# Patient Record
Sex: MALE | Race: WHITE | NOT HISPANIC OR LATINO | Employment: FULL TIME | URBAN - METROPOLITAN AREA
[De-identification: names, ages, dates, MRNs, and addresses within clinical notes are randomized per-mention and may not be internally consistent; named-entity substitution may affect disease eponyms.]

---

## 2018-11-29 ENCOUNTER — OFFICE VISIT CONVERTED (OUTPATIENT)
Dept: FAMILY MEDICINE CLINIC | Facility: CLINIC | Age: 48
End: 2018-11-29
Attending: NURSE PRACTITIONER

## 2019-04-11 ENCOUNTER — HOSPITAL ENCOUNTER (OUTPATIENT)
Dept: URGENT CARE | Facility: CLINIC | Age: 49
Discharge: HOME | End: 2019-04-11

## 2019-12-17 ENCOUNTER — HOSPITAL ENCOUNTER (OUTPATIENT)
Dept: FAMILY MEDICINE CLINIC | Facility: CLINIC | Age: 49
Discharge: HOME | End: 2019-12-17
Attending: NURSE PRACTITIONER

## 2019-12-17 ENCOUNTER — OFFICE VISIT CONVERTED (OUTPATIENT)
Dept: FAMILY MEDICINE CLINIC | Facility: CLINIC | Age: 49
End: 2019-12-17
Attending: NURSE PRACTITIONER

## 2019-12-17 LAB — PSA SERPL-MCNC: 2.64 NG/ML (ref 0–4)

## 2020-12-28 ENCOUNTER — TELEMEDICINE CONVERTED (OUTPATIENT)
Dept: FAMILY MEDICINE CLINIC | Facility: CLINIC | Age: 50
End: 2020-12-28
Attending: NURSE PRACTITIONER

## 2021-05-14 NOTE — PROGRESS NOTES
Progress Note      Patient Name: Marcelino Ogden   Patient ID: [id number]   Sex: Male   YOB: 1970    Primary Care Provider: Libra PATE    Visit Date: December 28, 2020    Provider: RIO Dent   Location: [facility]   Location Address: [address]  [place], [place]  [id number]   Location Phone: [phone]          Chief Complaint     Follow up       History Of Present Illness  Marcelino Ogden is a 50 year old /White male who presents for evaluation and treatment of:   Video Conferencing Visit  Marcelino Ogden is a 50 year old /White male who is presenting for evaluation via video conferencing via [employer]. Verbal consent obtained before beginning visit.   The following staff were present during this visit: Coco coleman, pt, lucitaw      He is a  and he is working from home most days.    He needs a refill the singular.  He had an appt with [facility] for labs but he cancelled  He started chewing at 6 years old.  GERD:  He is doing well with current med. If he eats at night he will take a tagament with the [employer].  His dentist checked 130's/80's at the dentist last week.       Past Medical History  Disease Name Date Onset Notes   Allergies --  --    Reflux Disease --  --          Past Surgical History  Procedure Name Date Notes   Colon Surgery 1970 Infant   Kidney Stone Surgery, Unspecified 1993 --          Medication List  Name Date Started Instructions   multivitamin oral tablet  take 1 tablet by oral route daily   omeprazole 40 mg oral capsule,delayed release(DR/EC)  take 1 capsule (40 mg) by oral route once daily before a meal   Singulair 10 mg oral tablet 12/28/2020 take 1 tablet (10 mg) by oral route once daily in the evening for 30 days   Zyrtec 10 mg oral tablet  take 1 tablet (10 mg) by oral route once daily         Allergy List  Allergen Name Date Reaction Notes   NO KNOWN DRUG ALLERGIES --  --  --        Allergies  Reconciled  Family Medical History  Disease Name Relative/Age Notes   Transient Ischemic Attack (TIA) Father/   --    Heart Disease Father/   bypass suregery   Breast Neoplasm Mother/   --    Colon Neoplasm Mother/   --    Lung cancer Mother/   --          Social History  Finding Status Start/Stop Quantity Notes   Alcohol Never --/-- --  --     --  --/-- --  --    Professional --  --/-- --  Laboatory supervisor   Smokeless tobacco Current every day 5/-- 1 can 12/28/2020 - 11/29/2018 -    Tobacco Current some day --/-- --  dip         Immunizations  NameDate Admin Mfg Trade Name Lot Number Route Inj VIS Given VIS Publication   Hepatitis A12/17/2019 NE Not Entered  NE NE 12/17/2019    Comments: Second Dose [facility]   Qpwxhsilb44/26/2020 NE Not Entered  NE NE 10/26/2020    Comments:    Tdap10/01/2019 [employer]RI[employer]  NE NE 12/17/2019    Comments:          Review of Systems  · Constitutional  o Denies  o : fever, fatigue, weight gain  · HENT  o Denies  o : nasal congestion, postnasal drip, sore throat, ear pain  · Cardiovascular  o Denies  o : lower extremity edema, claudication, chest pressure, palpitations  · Respiratory  o Denies  o : shortness of breath, wheezing, cough, hemoptysis, dyspnea on exertion  · Gastrointestinal  o Denies  o : nausea, vomiting, diarrhea, constipation, abdominal pain  · Psychiatric  o Denies  o : suicidal ideation, homicidal ideation      Physical Examination  · Constitutional  o Appearance  o : well-nourished, well developed, alert, in no acute distress  · Respiratory  o Respiratory Effort  o : breathing unlabored  o Auscultation of Lungs  o : normal breath sounds throughout  · Cardiovascular  o Heart  o :   § Auscultation of Heart  § : regular rate and rhythm, no murmurs, gallops or rubs  § Palpation of Heart  § : normal apical impulse, no cardiac thrill present  o Peripheral Vascular System  o :   § Carotid Arteries  § : normal pulses bilaterally, no bruits  present  § Pedal Pulses  § : pulses 2 bilaterally  § Extremities  § : no cyanosis, clubbing or edema; less than 2 second refill noted  · Neurologic  o Mental Status Examination  o :   § Orientation  § : grossly oriented to person, place and time  o Cranial Nerves  o : cranial nerves intact and symmetric throughout  · Psychiatric  o Mood and Affect  o : mood normal, affect appropriate, denies any SI/HI          Assessment  · Allergic rhinitis due to allergen     477.9/J30.9  · Gastroesophageal reflux disease without esophagitis     530.81/K21.9  · Nicotine dependence     305.1/F17.200  · Screening for depression     V79.0/Z13.89  · Screening for prostate cancer     V76.44/Z12.5  · Medication monitoring encounter     V58.83/Z51.81      Plan  · Orders  o ACO-18: Negative screen for clinical depression using a standardized tool () - V79.0/Z13.89 - 12/28/2020  o ACO-17: Screened for tobacco use AND received tobacco cessation intervention (4004F) - - 12/28/2020  o ACO-39: Current medications updated and reviewed () - - 12/28/2020  o ACO-14: Influenza immunization administered or previously received () - - 12/28/2020  o ACO-13: Fall Risk Screening with no falls in past year or only one fall without injury in the past year (1101F) - - 12/28/2020  · Medications  o Singulair 10 mg oral tablet   SIG: take 1 tablet (10 mg) by oral route once daily in the evening for 30 days   DISP: (30) Tablet with 11 refills  Refilled on 12/28/2020     · Instructions  o *Form of nicotine being used: dip  o Patient was strongly encouraged to discontinue use of any nicotine containing product or minimize the use of the product.  o Depression Screen completed and scanned into the EMR under the designated folder within the patient's documents.  o PHQ-9 result is 0   o Take all medications as prescribed/directed.  o Patient was educated/instructed on their diagnosis, treatment and medications prior to discharge from the clinic  today.  o Patient instructed to seek medical attention urgently for new or worsening symptoms.  o Call the office with any concerns or questions.  o Call with any concerns or questions. He is doing the singular. He does not want to stop using dip.   o He will call when he is ready for labs. He has cancelled 3 times with [facility]. He is trying to stay out of facility. He will let me know.   · Disposition  o Call or Return if symptoms worsen or persist.            Electronically Signed by: RIO Dent -Author on December 28, 2020 02:03:20 PM

## 2021-05-15 VITALS
SYSTOLIC BLOOD PRESSURE: 128 MMHG | WEIGHT: 189.25 LBS | RESPIRATION RATE: 12 BRPM | OXYGEN SATURATION: 96 % | TEMPERATURE: 99.1 F | HEART RATE: 99 BPM | DIASTOLIC BLOOD PRESSURE: 90 MMHG | BODY MASS INDEX: 28.03 KG/M2 | HEIGHT: 69 IN

## 2021-05-16 VITALS
TEMPERATURE: 99.2 F | RESPIRATION RATE: 12 BRPM | HEIGHT: 69 IN | OXYGEN SATURATION: 98 % | SYSTOLIC BLOOD PRESSURE: 153 MMHG | DIASTOLIC BLOOD PRESSURE: 95 MMHG | HEART RATE: 107 BPM | WEIGHT: 179.44 LBS | BODY MASS INDEX: 26.58 KG/M2

## 2022-01-03 RX ORDER — MONTELUKAST SODIUM 10 MG/1
TABLET ORAL
Qty: 30 TABLET | Refills: 0 | Status: SHIPPED | OUTPATIENT
Start: 2022-01-03 | End: 2022-02-28 | Stop reason: SDUPTHER

## 2022-02-01 RX ORDER — MONTELUKAST SODIUM 10 MG/1
TABLET ORAL
Qty: 30 TABLET | Refills: 0 | OUTPATIENT
Start: 2022-02-01

## 2022-02-28 ENCOUNTER — OFFICE VISIT (OUTPATIENT)
Dept: FAMILY MEDICINE CLINIC | Facility: CLINIC | Age: 52
End: 2022-02-28

## 2022-02-28 VITALS
WEIGHT: 190 LBS | RESPIRATION RATE: 12 BRPM | SYSTOLIC BLOOD PRESSURE: 144 MMHG | HEART RATE: 90 BPM | HEIGHT: 69 IN | BODY MASS INDEX: 28.14 KG/M2 | TEMPERATURE: 98.3 F | DIASTOLIC BLOOD PRESSURE: 94 MMHG | OXYGEN SATURATION: 97 %

## 2022-02-28 DIAGNOSIS — J30.89 SEASONAL ALLERGIC RHINITIS DUE TO OTHER ALLERGIC TRIGGER: ICD-10-CM

## 2022-02-28 DIAGNOSIS — R12 HEARTBURN: ICD-10-CM

## 2022-02-28 DIAGNOSIS — R93.89 ABNORMAL CXR: ICD-10-CM

## 2022-02-28 DIAGNOSIS — I10 PRIMARY HYPERTENSION: Primary | ICD-10-CM

## 2022-02-28 DIAGNOSIS — Z12.11 SCREEN FOR COLON CANCER: ICD-10-CM

## 2022-02-28 PROBLEM — J30.2 SEASONAL ALLERGIC RHINITIS: Chronic | Status: ACTIVE | Noted: 2022-02-28

## 2022-02-28 PROCEDURE — 99214 OFFICE O/P EST MOD 30 MIN: CPT | Performed by: NURSE PRACTITIONER

## 2022-02-28 RX ORDER — LISINOPRIL 10 MG/1
10 TABLET ORAL DAILY
Qty: 90 TABLET | Refills: 0 | Status: SHIPPED | OUTPATIENT
Start: 2022-02-28 | End: 2022-05-23 | Stop reason: DRUGHIGH

## 2022-02-28 RX ORDER — MONTELUKAST SODIUM 10 MG/1
10 TABLET ORAL EVERY EVENING
Qty: 90 TABLET | Refills: 3 | Status: SHIPPED | OUTPATIENT
Start: 2022-02-28 | End: 2022-05-23 | Stop reason: SDUPTHER

## 2022-03-07 ENCOUNTER — TELEPHONE (OUTPATIENT)
Dept: FAMILY MEDICINE CLINIC | Facility: CLINIC | Age: 52
End: 2022-03-07

## 2022-03-07 DIAGNOSIS — R93.89 ABNORMAL CXR: Primary | ICD-10-CM

## 2022-03-07 NOTE — TELEPHONE ENCOUNTER
3/3/22 TAVR team confirm does not needs CT ANGIO TAVR CHEST ABDOMEN. Please send new order for CTA CHEST for testing scheduling. Thanks!  (Message from hub regarding the CT order).

## 2022-04-01 ENCOUNTER — HOSPITAL ENCOUNTER (OUTPATIENT)
Dept: CT IMAGING | Facility: HOSPITAL | Age: 52
Discharge: HOME | End: 2022-04-01
Admitting: NURSE PRACTITIONER

## 2022-04-01 DIAGNOSIS — R93.89 ABNORMAL CXR: ICD-10-CM

## 2022-04-01 LAB
CREAT BLDA-MCNC: 1 MG/DL
EGFRCR SERPLBLD CKD-EPI 2021: 91.7 ML/MIN/1.73

## 2022-04-01 PROCEDURE — 71275 CT ANGIOGRAPHY CHEST: CPT

## 2022-04-01 PROCEDURE — 0 IOPAMIDOL PER 1 ML: Performed by: NURSE PRACTITIONER

## 2022-04-01 PROCEDURE — 82565 ASSAY OF CREATININE: CPT

## 2022-04-01 RX ADMIN — IOPAMIDOL 100 ML: 755 INJECTION, SOLUTION INTRAVENOUS at 16:59

## 2022-04-04 ENCOUNTER — TELEPHONE (OUTPATIENT)
Dept: FAMILY MEDICINE CLINIC | Facility: CLINIC | Age: 52
End: 2022-04-04

## 2022-04-04 RX ORDER — LISINOPRIL 5 MG/1
5 TABLET ORAL DAILY
COMMUNITY
End: 2022-05-23 | Stop reason: SDUPTHER

## 2022-04-04 NOTE — TELEPHONE ENCOUNTER
Patient states he experienced nose bleed and lower Bood pressure, therefore he decreased his Lisinopril to 5 mg and it is running better. 120/70 P-88.

## 2022-04-11 ENCOUNTER — PREP FOR SURGERY (OUTPATIENT)
Dept: OTHER | Facility: HOSPITAL | Age: 52
End: 2022-04-11

## 2022-04-11 ENCOUNTER — OFFICE VISIT (OUTPATIENT)
Dept: SURGERY | Facility: CLINIC | Age: 52
End: 2022-04-11

## 2022-04-11 VITALS — HEART RATE: 88 BPM | HEIGHT: 69 IN | RESPIRATION RATE: 18 BRPM | WEIGHT: 193 LBS | BODY MASS INDEX: 28.58 KG/M2

## 2022-04-11 DIAGNOSIS — Z12.11 SCREENING FOR MALIGNANT NEOPLASM OF COLON: Primary | ICD-10-CM

## 2022-04-11 DIAGNOSIS — Z80.0 FAMILY HISTORY OF COLON CANCER: ICD-10-CM

## 2022-04-11 PROCEDURE — S0285 CNSLT BEFORE SCREEN COLONOSC: HCPCS | Performed by: NURSE PRACTITIONER

## 2022-04-11 RX ORDER — CETIRIZINE HYDROCHLORIDE 10 MG/1
TABLET ORAL
COMMUNITY

## 2022-04-11 RX ORDER — SODIUM CHLORIDE 0.9 % (FLUSH) 0.9 %
10 SYRINGE (ML) INJECTION AS NEEDED
Status: CANCELLED | OUTPATIENT
Start: 2022-04-11

## 2022-04-11 RX ORDER — POLYETHYLENE GLYCOL 3350 17 G/17G
POWDER, FOR SOLUTION ORAL
Qty: 238 PACKET | Refills: 0 | Status: ON HOLD | OUTPATIENT
Start: 2022-04-11 | End: 2022-07-11

## 2022-04-11 RX ORDER — DIPHENOXYLATE HYDROCHLORIDE AND ATROPINE SULFATE 2.5; .025 MG/1; MG/1
TABLET ORAL DAILY
COMMUNITY

## 2022-04-11 RX ORDER — SODIUM CHLORIDE 0.9 % (FLUSH) 0.9 %
3 SYRINGE (ML) INJECTION EVERY 12 HOURS SCHEDULED
Status: CANCELLED | OUTPATIENT
Start: 2022-04-11

## 2022-04-11 RX ORDER — OMEPRAZOLE 40 MG/1
20 CAPSULE, DELAYED RELEASE ORAL DAILY
COMMUNITY

## 2022-04-11 NOTE — PROGRESS NOTES
Chief Complaint: Colonoscopy (consult)    Subjective      Colonoscopy consultation       History of Present Illness  Marcelino Ogden is a 51 y.o. male presents to [facility] for colonoscopy consultation.    Patient presents today on referral from Leighann Kahn for colonoscopy consultation.    Patient denies any abdominal pain, change in bowel habit, rectal bleeding.    Admits to family history of colon cancer with his mother.    No previous colonoscopy.    Objective     Past Medical History:   Diagnosis Date   • Acid reflux    • Hiatal hernia        Past Surgical History:   Procedure Laterality Date   • COLON SURGERY         Outpatient Medications Marked as Taking for the 4/11/22 encounter (Office Visit) with Jed April, APRN   Medication Sig Dispense Refill   • cetirizine (zyrTEC) 10 MG tablet Zyrtec 10 mg oral tablet take 1 tablet (10 mg) by oral route once daily   Active     • lisinopril (PRINIVIL,ZESTRIL) 5 MG tablet Take 5 mg by mouth Daily.     • montelukast (SINGULAIR) 10 MG tablet Take 1 tablet by mouth Every Evening. 90 tablet 3   • multivitamin (MULTI-VITAMIN DAILY PO) Take  by mouth Daily.     • omeprazole (priLOSEC) 40 MG capsule omeprazole 40 mg oral capsule,delayed release(DR/EC) take 1 capsule (40 mg) by oral route once daily before a meal   Active         Allergies   Allergen Reactions   • Silicone Swelling        Family History   Problem Relation Age of Onset   • Other Mother         breast cancer then 7 years later appendix,lung and colon   • Stroke Father    • Other Paternal Grandmother         chf       Social History     Socioeconomic History   • Marital status:    Tobacco Use   • Smoking status: Never Smoker   • Smokeless tobacco: Current User     Types: Chew   Vaping Use   • Vaping Use: Never used   Substance and Sexual Activity   • Alcohol use: Never   • Drug use: Never   • Sexual activity: Defer       Review of Systems   Constitutional:  "Negative for chills and fever.   Gastrointestinal: Negative for abdominal distention, abdominal pain, anal bleeding, blood in stool, constipation, diarrhea and rectal pain.        Vital Signs:   Pulse 88   Resp 18   Ht 175.3 cm (69\")   Wt 87.5 kg (193 lb)   BMI 28.50 kg/m²      Physical Exam  Constitutional:       Appearance: Normal appearance.   HENT:      Head: Normocephalic.   Cardiovascular:      Rate and Rhythm: Normal rate.   Pulmonary:      Effort: Pulmonary effort is normal.   Abdominal:      General: Abdomen is flat.      Palpations: Abdomen is soft.   Skin:     General: Skin is warm and dry.   Neurological:      General: No focal deficit present.      Mental Status: He is alert and oriented to person, place, and time.   Psychiatric:         Mood and Affect: Mood normal.         Thought Content: Thought content normal.          Result Review :          []  Laboratory  []  Radiology  []  Pathology  []  Microbiology  []  EKG/Telemetry   []  Cardiology/Vascular   [x]  Old records  Today I reviewed Leigahnn Kahn's previous office note.     Assessment and Plan    Diagnoses and all orders for this visit:    1. Screening for malignant neoplasm of colon (Primary)    2. Family history of colon cancer    Other orders  -     polyethylene glycol (MIRALAX) 17 g packet; Take as directed.  Instructions given in office.  Dispense: 238 g bottle  Dispense: 238 packet; Refill: 0     orange prep    Follow Up   Return for Schedule colonoscopy with Dr. Lundberg on 7/1/2022 at [facility].     Hospital arrival time: 0600    Possible risks/complications, benefits, and alternatives to surgical or invasive procedure have been explained to patient and/or legal guardian.     Patient has been evaluated and can tolerate anesthesia and/or sedation. Risks, benefits, and alternatives to anesthesia and sedation have been explained to patient and/or legal guardian.  Patient verbalizes understanding is will proceed " with above plan.    Patient was given instructions and counseling regarding his condition or for health maintenance advice. Please see specific information pulled into the AVS if appropriate.

## 2022-05-16 DIAGNOSIS — I10 PRIMARY HYPERTENSION: ICD-10-CM

## 2022-05-16 RX ORDER — LISINOPRIL 10 MG/1
TABLET ORAL
Qty: 90 TABLET | Refills: 0 | OUTPATIENT
Start: 2022-05-16

## 2022-05-23 ENCOUNTER — OFFICE VISIT (OUTPATIENT)
Dept: FAMILY MEDICINE CLINIC | Facility: CLINIC | Age: 52
End: 2022-05-23

## 2022-05-23 VITALS
SYSTOLIC BLOOD PRESSURE: 120 MMHG | HEART RATE: 83 BPM | RESPIRATION RATE: 20 BRPM | OXYGEN SATURATION: 98 % | WEIGHT: 193.7 LBS | HEIGHT: 69 IN | BODY MASS INDEX: 28.69 KG/M2 | TEMPERATURE: 98.1 F | DIASTOLIC BLOOD PRESSURE: 80 MMHG

## 2022-05-23 DIAGNOSIS — J30.89 SEASONAL ALLERGIC RHINITIS DUE TO OTHER ALLERGIC TRIGGER: ICD-10-CM

## 2022-05-23 DIAGNOSIS — Z11.59 ENCOUNTER FOR HEPATITIS C SCREENING TEST FOR LOW RISK PATIENT: ICD-10-CM

## 2022-05-23 DIAGNOSIS — I10 PRIMARY HYPERTENSION: Primary | ICD-10-CM

## 2022-05-23 PROCEDURE — 99214 OFFICE O/P EST MOD 30 MIN: CPT | Performed by: NURSE PRACTITIONER

## 2022-05-23 RX ORDER — MONTELUKAST SODIUM 10 MG/1
10 TABLET ORAL EVERY EVENING
Qty: 90 TABLET | Refills: 1 | Status: SHIPPED | OUTPATIENT
Start: 2022-05-23 | End: 2022-11-14 | Stop reason: SDUPTHER

## 2022-05-23 RX ORDER — LISINOPRIL 5 MG/1
5 TABLET ORAL DAILY
Qty: 90 TABLET | Refills: 1 | Status: SHIPPED | OUTPATIENT
Start: 2022-05-23 | End: 2022-11-14 | Stop reason: SDUPTHER

## 2022-05-23 NOTE — PROGRESS NOTES
"Chief Complaint  Hypertension    Subjective          Marcelino Ogden presents to [facility]  History of Present Illness  HTN:  He is taking his lisinopril at 5mg and is doing well with current med.  ALLERGIES:  He is taking singular.  His chol was slt elevated from [facility].      Allergies  Silicone    Social History     Tobacco Use   • Smoking status: Never Smoker   • Smokeless tobacco: Current User     Types: Chew   Vaping Use   • Vaping Use: Never used   Substance Use Topics   • Alcohol use: Never   • Drug use: Never       Family History   Problem Relation Age of Onset   • Other Mother         breast cancer then 7 years later appendix,lung and colon   • Stroke Father    • Other Paternal Grandmother         chf        Health Maintenance Due   Topic Date Due   • ANNUAL PHYSICAL  Never done   • HEPATITIS C SCREENING  Never done        Immunization History   Administered Date(s) Administered   • COVID-19 (PFIZER) PURPLE CAP 03/19/2021, 04/16/2021, 12/07/2021   • Flu Vaccine Intradermal Quad 18-64YR 10/13/2021   • Flu Vaccine Quad PF >36MO 10/21/2019, 10/26/2020       Review of Systems   Constitutional: Negative for fatigue.   Respiratory: Negative for cough and shortness of breath.    Cardiovascular: Negative for chest pain.   Gastrointestinal: Negative for diarrhea, nausea and vomiting.        Objective       Vitals:    05/23/22 1506 05/23/22 1515   BP: 140/86 120/80   Pulse: 83    Resp: 20    Temp: 98.1 °F (36.7 °C)    SpO2: 98%    Weight: 87.9 kg (193 lb 11.2 oz)    Height: 175.3 cm (69\")        Body mass index is 28.6 kg/m².         Physical Exam  Vitals reviewed.   Constitutional:       Appearance: Normal appearance. He is well-developed.   Cardiovascular:      Rate and Rhythm: Normal rate and regular rhythm.      Heart sounds: Normal heart sounds. No murmur heard.  Pulmonary:      Effort: Pulmonary effort is normal.      Breath sounds: Normal breath sounds.   Neurological:      " Mental Status: He is alert and oriented to person, place, and time.      Cranial Nerves: No cranial nerve deficit.      Motor: No weakness.   Psychiatric:         Mood and Affect: Mood and affect normal.             Result Review :     The following data was reviewed by: RIO Dent on 05/23/2022:    Common labs    Common Labsle 12/1/21 12/1/21 12/1/21 4/1/22    1229 1229 1229    Glucose   95    BUN   8    Creatinine   1.03 1.00   eGFR Non  Am   >60    eGFR African Am   >60    Sodium   142    Potassium   4.2    Chloride   105    Calcium   9.5    Albumin   4.8    Total Bilirubin   0.4    Alkaline Phosphatase   98    AST (SGOT)   23    ALT (SGPT)   35    WBC 5.70      Hemoglobin 14.5      Hematocrit 46.3      Platelets 350      Total Cholesterol  184     Triglycerides  131     HDL Cholesterol  55     LDL Cholesterol   102.8 (A)     (A) Abnormal value       Comments are available for some flowsheets but are not being displayed.                          Assessment and Plan      Diagnoses and all orders for this visit:    1. Primary hypertension (Primary)  -     lisinopril (PRINIVIL,ZESTRIL) 5 MG tablet; Take 1 tablet by mouth Daily.  Dispense: 90 tablet; Refill: 1  -     Comprehensive Metabolic Panel; Future  -     CBC & Differential; Future  -     Lipid Panel; Future    2. Seasonal allergic rhinitis due to other allergic trigger  -     montelukast (SINGULAIR) 10 MG tablet; Take 1 tablet by mouth Every Evening.  Dispense: 90 tablet; Refill: 1    3. Encounter for hepatitis C screening test for low risk patient  -     Hepatitis C antibody; Future            Follow Up     Return in about 6 months (around 11/23/2022).  He is doing good with the lisinopril.  He will do labs at [facility].  Follow-up in 6 months for labs and appt. Call with any concerns or questions that you may have regarding your medications or history.    He was cutting the lisinopril in half and we will cont with the 5mg.    Patient was  given instructions and counseling regarding his condition or for health maintenance advice. Please see specific information pulled into the AVS if appropriate.         Libra Alvarez, APRN  05/23/2022

## 2022-05-25 ENCOUNTER — LAB (OUTPATIENT)
Dept: LAB | Facility: HOSPITAL | Age: 52
End: 2022-05-25

## 2022-05-25 DIAGNOSIS — Z11.59 ENCOUNTER FOR HEPATITIS C SCREENING TEST FOR LOW RISK PATIENT: ICD-10-CM

## 2022-05-25 DIAGNOSIS — I10 PRIMARY HYPERTENSION: ICD-10-CM

## 2022-05-25 LAB
ALBUMIN SERPL-MCNC: 4.7 G/DL (ref 3.5–5.2)
ALBUMIN/GLOB SERPL: 2 G/DL
ALP SERPL-CCNC: 88 U/L (ref 39–117)
ALT SERPL W P-5'-P-CCNC: 28 U/L (ref 1–41)
ANION GAP SERPL CALCULATED.3IONS-SCNC: 10.3 MMOL/L (ref 5–15)
AST SERPL-CCNC: 20 U/L (ref 1–40)
BASOPHILS # BLD AUTO: 0.06 10*3/MM3 (ref 0–0.2)
BASOPHILS NFR BLD AUTO: 1 % (ref 0–1.5)
BILIRUB SERPL-MCNC: 0.4 MG/DL (ref 0–1.2)
BUN SERPL-MCNC: 9 MG/DL (ref 6–20)
BUN/CREAT SERPL: 8.8 (ref 7–25)
CALCIUM SPEC-SCNC: 9.4 MG/DL (ref 8.6–10.5)
CHLORIDE SERPL-SCNC: 105 MMOL/L (ref 98–107)
CHOLEST SERPL-MCNC: 163 MG/DL (ref 0–200)
CO2 SERPL-SCNC: 22.7 MMOL/L (ref 22–29)
CREAT SERPL-MCNC: 1.02 MG/DL (ref 0.76–1.27)
DEPRECATED RDW RBC AUTO: 42.5 FL (ref 37–54)
EGFRCR SERPLBLD CKD-EPI 2021: 89 ML/MIN/1.73
EOSINOPHIL # BLD AUTO: 0.17 10*3/MM3 (ref 0–0.4)
EOSINOPHIL NFR BLD AUTO: 2.8 % (ref 0.3–6.2)
ERYTHROCYTE [DISTWIDTH] IN BLOOD BY AUTOMATED COUNT: 13.3 % (ref 12.3–15.4)
GLOBULIN UR ELPH-MCNC: 2.4 GM/DL
GLUCOSE SERPL-MCNC: 94 MG/DL (ref 65–99)
HCT VFR BLD AUTO: 43.4 % (ref 37.5–51)
HCV AB SER DONR QL: NORMAL
HDLC SERPL-MCNC: 47 MG/DL (ref 40–60)
HGB BLD-MCNC: 14.1 G/DL (ref 13–17.7)
IMM GRANULOCYTES # BLD AUTO: 0.02 10*3/MM3 (ref 0–0.05)
IMM GRANULOCYTES NFR BLD AUTO: 0.3 % (ref 0–0.5)
LDLC SERPL CALC-MCNC: 87 MG/DL (ref 0–100)
LDLC/HDLC SERPL: 1.76 {RATIO}
LYMPHOCYTES # BLD AUTO: 1.92 10*3/MM3 (ref 0.7–3.1)
LYMPHOCYTES NFR BLD AUTO: 31.8 % (ref 19.6–45.3)
MCH RBC QN AUTO: 28.7 PG (ref 26.6–33)
MCHC RBC AUTO-ENTMCNC: 32.5 G/DL (ref 31.5–35.7)
MCV RBC AUTO: 88.2 FL (ref 79–97)
MONOCYTES # BLD AUTO: 0.6 10*3/MM3 (ref 0.1–0.9)
MONOCYTES NFR BLD AUTO: 9.9 % (ref 5–12)
NEUTROPHILS NFR BLD AUTO: 3.27 10*3/MM3 (ref 1.7–7)
NEUTROPHILS NFR BLD AUTO: 54.2 % (ref 42.7–76)
NRBC BLD AUTO-RTO: 0 /100 WBC (ref 0–0.2)
PLATELET # BLD AUTO: 364 10*3/MM3 (ref 140–450)
PMV BLD AUTO: 9.7 FL (ref 6–12)
POTASSIUM SERPL-SCNC: 4.2 MMOL/L (ref 3.5–5.2)
PROT SERPL-MCNC: 7.1 G/DL (ref 6–8.5)
RBC # BLD AUTO: 4.92 10*6/MM3 (ref 4.14–5.8)
SODIUM SERPL-SCNC: 138 MMOL/L (ref 136–145)
TRIGL SERPL-MCNC: 166 MG/DL (ref 0–150)
VLDLC SERPL-MCNC: 29 MG/DL (ref 5–40)
WBC NRBC COR # BLD: 6.04 10*3/MM3 (ref 3.4–10.8)

## 2022-05-25 PROCEDURE — 85025 COMPLETE CBC W/AUTO DIFF WBC: CPT

## 2022-05-25 PROCEDURE — 36415 COLL VENOUS BLD VENIPUNCTURE: CPT

## 2022-05-25 PROCEDURE — 80061 LIPID PANEL: CPT

## 2022-05-25 PROCEDURE — 86803 HEPATITIS C AB TEST: CPT

## 2022-05-25 PROCEDURE — 80053 COMPREHEN METABOLIC PANEL: CPT

## 2022-07-11 ENCOUNTER — ANESTHESIA (OUTPATIENT)
Dept: GASTROENTEROLOGY | Facility: HOSPITAL | Age: 52
End: 2022-07-11

## 2022-07-11 ENCOUNTER — ANESTHESIA EVENT (OUTPATIENT)
Dept: GASTROENTEROLOGY | Facility: HOSPITAL | Age: 52
End: 2022-07-11

## 2022-07-11 ENCOUNTER — HOSPITAL ENCOUNTER (OUTPATIENT)
Facility: HOSPITAL | Age: 52
Setting detail: HOSPITAL OUTPATIENT SURGERY
Discharge: HOME | End: 2022-07-11
Attending: SURGERY | Admitting: SURGERY

## 2022-07-11 VITALS
BODY MASS INDEX: 27.49 KG/M2 | WEIGHT: 185.63 LBS | TEMPERATURE: 98.9 F | RESPIRATION RATE: 20 BRPM | HEIGHT: 69 IN | DIASTOLIC BLOOD PRESSURE: 87 MMHG | HEART RATE: 72 BPM | OXYGEN SATURATION: 98 % | SYSTOLIC BLOOD PRESSURE: 134 MMHG

## 2022-07-11 DIAGNOSIS — Z80.0 FAMILY HISTORY OF COLON CANCER: ICD-10-CM

## 2022-07-11 DIAGNOSIS — Z12.11 SCREENING FOR MALIGNANT NEOPLASM OF COLON: ICD-10-CM

## 2022-07-11 PROCEDURE — 25010000002 PROPOFOL 10 MG/ML EMULSION: Performed by: NURSE ANESTHETIST, CERTIFIED REGISTERED

## 2022-07-11 RX ORDER — SODIUM CHLORIDE, SODIUM LACTATE, POTASSIUM CHLORIDE, CALCIUM CHLORIDE 600; 310; 30; 20 MG/100ML; MG/100ML; MG/100ML; MG/100ML
30 INJECTION, SOLUTION INTRAVENOUS CONTINUOUS
Status: DISCONTINUED | OUTPATIENT
Start: 2022-07-11 | End: 2022-07-11 | Stop reason: HOSPADM

## 2022-07-11 RX ORDER — LIDOCAINE HYDROCHLORIDE 20 MG/ML
INJECTION, SOLUTION EPIDURAL; INFILTRATION; INTRACAUDAL; PERINEURAL AS NEEDED
Status: DISCONTINUED | OUTPATIENT
Start: 2022-07-11 | End: 2022-07-11 | Stop reason: SURG

## 2022-07-11 RX ORDER — SODIUM CHLORIDE 0.9 % (FLUSH) 0.9 %
3 SYRINGE (ML) INJECTION EVERY 12 HOURS SCHEDULED
Status: DISCONTINUED | OUTPATIENT
Start: 2022-07-11 | End: 2022-07-11 | Stop reason: HOSPADM

## 2022-07-11 RX ORDER — SODIUM CHLORIDE 0.9 % (FLUSH) 0.9 %
10 SYRINGE (ML) INJECTION AS NEEDED
Status: DISCONTINUED | OUTPATIENT
Start: 2022-07-11 | End: 2022-07-11 | Stop reason: HOSPADM

## 2022-07-11 RX ORDER — ONDANSETRON 4 MG/1
4 TABLET, FILM COATED ORAL ONCE AS NEEDED
Status: DISCONTINUED | OUTPATIENT
Start: 2022-07-11 | End: 2022-07-11 | Stop reason: HOSPADM

## 2022-07-11 RX ORDER — PROPOFOL 10 MG/ML
VIAL (ML) INTRAVENOUS AS NEEDED
Status: DISCONTINUED | OUTPATIENT
Start: 2022-07-11 | End: 2022-07-11 | Stop reason: SURG

## 2022-07-11 RX ORDER — ONDANSETRON 2 MG/ML
4 INJECTION INTRAMUSCULAR; INTRAVENOUS ONCE AS NEEDED
Status: DISCONTINUED | OUTPATIENT
Start: 2022-07-11 | End: 2022-07-11 | Stop reason: HOSPADM

## 2022-07-11 RX ADMIN — LIDOCAINE HYDROCHLORIDE 100 MG: 20 INJECTION, SOLUTION EPIDURAL; INFILTRATION; INTRACAUDAL; PERINEURAL at 08:14

## 2022-07-11 RX ADMIN — SODIUM CHLORIDE, POTASSIUM CHLORIDE, SODIUM LACTATE AND CALCIUM CHLORIDE 30 ML/HR: 600; 310; 30; 20 INJECTION, SOLUTION INTRAVENOUS at 07:31

## 2022-07-11 RX ADMIN — PROPOFOL 200 MCG/KG/MIN: 10 INJECTION, EMULSION INTRAVENOUS at 08:14

## 2022-07-11 RX ADMIN — PROPOFOL 150 MG: 10 INJECTION, EMULSION INTRAVENOUS at 08:14

## 2022-07-11 NOTE — H&P
[facility]   HISTORY AND PHYSICAL    Patient Name: Marcelino Ogden  : 1970  MRN: [id number]  Primary Care Physician:  Libra Alvarez APRN  Date of admission: 2022    Subjective   Subjective     Chief Complaint: Colon screening    HPI:    Marcelino Ogden is a 52 y.o. male who has a family history of colorectal cancer.  He presents for colon screening.    Review of Systems   Respiratory: Negative for shortness of breath.    Cardiovascular: Negative for chest pain.   Gastrointestinal: Negative for abdominal pain.       Personal History     Past Medical History:   Diagnosis Date   • Acid reflux    • Hiatal hernia    • Hypertension        Past Surgical History:   Procedure Laterality Date   • COLON SURGERY      AS A INFANT-   • CYSTOSCOPY W/ LASER LITHOTRIPSY         Family History: family history includes Other in his mother and paternal grandmother; Stroke in his father. Otherwise pertinent FHx was reviewed and not pertinent to current issue.    Social History:  reports that he has never smoked. His smokeless tobacco use includes chew. He reports that he does not drink alcohol and does not use drugs.    Home Medications:  cetirizine, lisinopril, montelukast, multivitamin, and omeprazole    Allergies:  Allergies   Allergen Reactions   • Silicone Swelling       Objective    Objective     Vitals:   Temp:  [97 °F (36.1 °C)] 97 °F (36.1 °C)  Heart Rate:  [86] 86  Resp:  [16] 16  BP: (146)/(97) 146/97    Physical Exam  Constitutional:       Appearance: Normal appearance.   HENT:      Head: Normocephalic and atraumatic.   Cardiovascular:      Rate and Rhythm: Normal rate and regular rhythm.   Pulmonary:      Effort: Pulmonary effort is normal.   Abdominal:      Palpations: Abdomen is soft.      Tenderness: There is no abdominal tenderness.   Musculoskeletal:         General: Normal range of motion.      Cervical back: Normal range of motion and neck supple.   Skin:     General: Skin is warm and dry.    Neurological:      General: No focal deficit present.      Mental Status: He is alert and oriented to person, place, and time.   Psychiatric:         Mood and Affect: Mood normal.         Behavior: Behavior normal.         Result Review    Result Review:  I have personally reviewed the results from the time of this admission to 7/11/2022 07:34 EDT and agree with these findings:  []  Laboratory  []  Microbiology  []  Radiology  []  EKG/Telemetry   []  Cardiology/Vascular   []  Pathology  []  Old records  []  Other:  Most notable findings include:     Assessment & Plan   Assessment / Plan     Brief Patient Summary:  Marcelino Ogden is a 52 y.o. male who presents for colon screening.    Active Hospital Problems:  Active Hospital Problems    Diagnosis    • Screening for malignant neoplasm of colon      Added automatically from request for surgery [id number]     • Family history of colon cancer      Added automatically from request for surgery [id number]         Plan:   We will proceed with a colonoscopy.  Risk benefits and alternatives were explained.    DVT prophylaxis:  No DVT prophylaxis order currently exists.    CODE STATUS:         Admission Status:  I believe this patient meets outpatient status.    Electronically signed by Loyd Lundberg MD, 07/11/22, 7:34 AM EDT.

## 2022-07-11 NOTE — ANESTHESIA POSTPROCEDURE EVALUATION
Patient: Marcelino Ogden    Procedure Summary     Date: 07/11/22 Room / Location: [facility] ENDOSCOPY 3 / [facility] ENDOSCOPY    Anesthesia Start: 0810 Anesthesia Stop: 0833    Procedure: COLONOSCOPY (N/A ) Diagnosis:       Screening for malignant neoplasm of colon      Family history of colon cancer      (Screening for malignant neoplasm of colon [Z12.11])      (Family history of colon cancer [Z80.0])    Surgeons: Loyd Lundberg MD Provider: Raz Sotelo MD    Anesthesia Type: general ASA Status: 2          Anesthesia Type: general    Vitals  Vitals Value Taken Time   /87 07/11/22 0848   Temp 37.2 °C (98.9 °F) 07/11/22 0848   Pulse 74 07/11/22 0850   Resp 20 07/11/22 0848   SpO2 99 % 07/11/22 0850   Vitals shown include unvalidated device data.        Post Anesthesia Care and Evaluation    Patient location during evaluation: bedside  Patient participation: complete - patient participated  Level of consciousness: awake  Pain score: 0  Pain management: adequate    Airway patency: patent  Anesthetic complications: No anesthetic complications  PONV Status: none  Cardiovascular status: acceptable and stable  Respiratory status: acceptable and room air  Hydration status: acceptable    Comments: An Anesthesiologist personally participated in the most demanding procedures (including induction and emergence if applicable) in the anesthesia plan, monitored the course of anesthesia administration at frequent intervals and remained physically present and available for immediate diagnosis and treatment of emergencies.

## 2022-07-11 NOTE — ANESTHESIA PREPROCEDURE EVALUATION
Anesthesia Evaluation     Patient summary reviewed and Nursing notes reviewed   no history of anesthetic complications:  NPO Solid Status: > 8 hours  NPO Liquid Status: > 2 hours           Airway   Mallampati: III  TM distance: >3 FB  Neck ROM: full  No difficulty expected  Dental      Pulmonary - negative pulmonary ROS and normal exam    breath sounds clear to auscultation  Cardiovascular - normal exam  Exercise tolerance: good (4-7 METS)    Rhythm: regular  Rate: normal    (+) hypertension,       Neuro/Psych- negative ROS  GI/Hepatic/Renal/Endo    (+)  hiatal hernia, GERD,      Musculoskeletal (-) negative ROS    Abdominal    Substance History - negative use     OB/GYN negative ob/gyn ROS         Other - negative ROS                       Anesthesia Plan    ASA 2     general     (Total IV Anesthesia    Patient understands anesthesia not responsible for dental damage.  )  intravenous induction     Anesthetic plan, risks, benefits, and alternatives have been provided, discussed and informed consent has been obtained with: patient.    Plan discussed with CRNA.        CODE STATUS:

## 2022-11-14 ENCOUNTER — OFFICE VISIT (OUTPATIENT)
Dept: FAMILY MEDICINE CLINIC | Facility: CLINIC | Age: 52
End: 2022-11-14

## 2022-11-14 DIAGNOSIS — J30.89 SEASONAL ALLERGIC RHINITIS DUE TO OTHER ALLERGIC TRIGGER: ICD-10-CM

## 2022-11-14 DIAGNOSIS — I10 PRIMARY HYPERTENSION: ICD-10-CM

## 2022-11-14 PROCEDURE — 99213 OFFICE O/P EST LOW 20 MIN: CPT | Performed by: NURSE PRACTITIONER

## 2022-11-14 RX ORDER — MONTELUKAST SODIUM 10 MG/1
10 TABLET ORAL EVERY EVENING
Qty: 90 TABLET | Refills: 1 | Status: SHIPPED | OUTPATIENT
Start: 2022-11-14

## 2022-11-14 RX ORDER — LISINOPRIL 5 MG/1
5 TABLET ORAL DAILY
Qty: 90 TABLET | Refills: 1 | Status: SHIPPED | OUTPATIENT
Start: 2022-11-14

## 2022-11-14 NOTE — PROGRESS NOTES
Chief Complaint  Hypertension (Six month follow up )    Subjective         Marcelino Ogden presents to [facility]  History of Present Illness  Objective    HTN:He is doing good with the lisinopril.  He is going to [place] in Dec for his routine PE.  Allergies: he is taking the singular.  He does have on and off issues.    Vital Signs:   There were no vitals taken for this visit.    Physical Exam   Constitutional: He appears well-developed and well-nourished.   HENT:   Head: Normocephalic.   Pulmonary/Chest: Effort normal.  No respiratory distress.  Neurological: He is alert. No cranial nerve deficit.   Skin: No bruising and no rash noted.   Psychiatric: He has a normal mood and affect. His speech is normal and behavior is normal. Thought content normal.     Result Review :     Common labs    Common Labs 12/1/21 12/1/21 12/1/21 4/1/22 5/25/22 5/25/22 5/25/22    1229 1229 1229  0936 0936 0936   Glucose      94    Glucose   95       BUN   8   9    Creatinine   1.03 1.00  1.02    eGFR Non  Am   >60       eGFR African Am   >60       Sodium   142   138    Potassium   4.2   4.2    Chloride   105   105    Calcium   9.5   9.4    Albumin   4.8   4.70    Total Bilirubin   0.4   0.4    Alkaline Phosphatase   98   88    AST (SGOT)   23   20    ALT (SGPT)   35   28    WBC 5.70    6.04     Hemoglobin 14.5    14.1     Hematocrit 46.3    43.4     Platelets 350    364     Total Cholesterol       163   Total Cholesterol  184        Triglycerides  131     166 (A)   HDL Cholesterol  55     47   LDL Cholesterol   102.8 (A)     87   (A) Abnormal value       Comments are available for some flowsheets but are not being displayed.                         Assessment and Plan    Diagnoses and all orders for this visit:    1. Primary hypertension  -     lisinopril (PRINIVIL,ZESTRIL) 5 MG tablet; Take 1 tablet by mouth Daily.  Dispense: 90 tablet; Refill: 1    2. Seasonal allergic rhinitis due to other  allergic trigger  -     montelukast (SINGULAIR) 10 MG tablet; Take 1 tablet by mouth Every Evening.  Dispense: 90 tablet; Refill: 1        Follow Up   Return in about 6 months (around 5/14/2023).   Follow-up in 6 months for labs and appt. Call with any concerns or questions that you may have regarding your medications or history.    He will do his labs with  when he does the big physical  Patient was given instructions and counseling regarding his condition or for health maintenance advice. Please see specific information pulled into the AVS if appropriate.     Mode of Visit: Video  Location of patient: home  Location of provider: home  You have chosen to receive care through a telehealth visit.  Does the patient consent to use a video/audio connection for your medical care today? Yes  The visit included audio and video interaction. No technical issues occurred during this visit.   Answers for HPI/ROS submitted by the patient on 11/10/2022  What is the primary reason for your visit?: High Blood Pressure    Libra Alvarez, APRN  11/14/2022

## 2023-02-21 DIAGNOSIS — J30.89 SEASONAL ALLERGIC RHINITIS DUE TO OTHER ALLERGIC TRIGGER: ICD-10-CM

## 2023-02-21 RX ORDER — MONTELUKAST SODIUM 10 MG/1
TABLET ORAL
Qty: 90 TABLET | Refills: 1 | OUTPATIENT
Start: 2023-02-21

## 2023-05-15 ENCOUNTER — OFFICE VISIT (OUTPATIENT)
Dept: FAMILY MEDICINE CLINIC | Facility: CLINIC | Age: 53
End: 2023-05-15
Payer: COMMERCIAL

## 2023-05-15 VITALS
TEMPERATURE: 98.3 F | OXYGEN SATURATION: 98 % | HEART RATE: 99 BPM | SYSTOLIC BLOOD PRESSURE: 130 MMHG | HEIGHT: 69 IN | BODY MASS INDEX: 29 KG/M2 | WEIGHT: 195.8 LBS | DIASTOLIC BLOOD PRESSURE: 80 MMHG

## 2023-05-15 DIAGNOSIS — J30.89 SEASONAL ALLERGIC RHINITIS DUE TO OTHER ALLERGIC TRIGGER: ICD-10-CM

## 2023-05-15 DIAGNOSIS — I10 PRIMARY HYPERTENSION: Primary | ICD-10-CM

## 2023-05-15 DIAGNOSIS — Z12.5 SCREENING PSA (PROSTATE SPECIFIC ANTIGEN): ICD-10-CM

## 2023-05-15 LAB — PSA SERPL-MCNC: 4.38 NG/ML (ref 0–4)

## 2023-05-15 PROCEDURE — G0103 PSA SCREENING: HCPCS | Performed by: NURSE PRACTITIONER

## 2023-05-15 RX ORDER — MONTELUKAST SODIUM 10 MG/1
10 TABLET ORAL EVERY EVENING
Qty: 90 TABLET | Refills: 1 | Status: SHIPPED | OUTPATIENT
Start: 2023-05-15

## 2023-05-15 RX ORDER — LISINOPRIL 5 MG/1
5 TABLET ORAL 2 TIMES DAILY
Qty: 180 TABLET | Refills: 1 | Status: SHIPPED | OUTPATIENT
Start: 2023-05-15

## 2023-05-15 NOTE — PROGRESS NOTES
Answers for HPI/ROS submitted by the patient on 5/14/2023  What is the primary reason for your visit?: High Blood Pressure  Chronicity: recurrent  Onset: more than 1 year ago  Progression since onset: unchanged  Condition status: controlled  anxiety: No  blurred vision: No  chest pain: No  headaches: No  malaise/fatigue: No  neck pain: No  orthopnea: No  palpitations: No  peripheral edema: No  PND: No  shortness of breath: No  sweats: No  Agents associated with hypertension: no associated agents  CAD risks: smoking/tobacco exposure  Compliance problems: no compliance problems    Chief Complaint  Hypertension    Subjective          Marcelino Ogden presents to [facility]  History of Present Illness  HTN:  He has had labs and it is scanned in.  He is taking his meds.  He did his PE in [place] and it was elevated at that time.  Allergies:  He is taking singular.  [place]      Allergies  Silicone    Social History     Tobacco Use   • Smoking status: Never   • Smokeless tobacco: Current     Types: Chew   • Tobacco comments:     Smokeless   Vaping Use   • Vaping Use: Never used   Substance Use Topics   • Alcohol use: Not Currently     Comment: Quit 15 years ago   • Drug use: Never       Family History   Problem Relation Age of Onset   • Other Mother         breast cancer then 7 years later appendix,lung and colon   • Cancer Mother    • Stroke Father    • Kidney nephrosis Father    • Other Paternal Grandmother         chf        Health Maintenance Due   Topic Date Due   • ANNUAL PHYSICAL  Never done        Immunization History   Administered Date(s) Administered   • COVID-19 (PFIZER) BIVALENT 12+YRS 10/31/2022   • Flu Vaccine Intradermal Quad 18-64YR 10/13/2021   • Flu Vaccine Quad PF >36MO 10/21/2019, 10/26/2020   • Flu Vaccine Split Quad 10/18/2019   • Hepatitis A 12/17/2019   • Hepatitis B Adult/Adolescent IM 01/01/2006   • Influenza Injectable Mdck Pf  "Quad 10/31/2022   • Shingrix 12/07/2021, 02/28/2022   • Tdap 10/01/2019       Review of Systems   Eyes: Negative for blurred vision.   Respiratory: Negative for shortness of breath.    Cardiovascular: Negative for chest pain and palpitations.   Musculoskeletal: Negative for neck pain.        Objective       Vitals:    05/15/23 1539 05/15/23 1543   BP: 151/86 130/80   Pulse: 99    Temp: 98.3 °F (36.8 °C)    SpO2: 98%    Weight: 88.8 kg (195 lb 12.8 oz)    Height: 175.3 cm (69\")        Body mass index is 28.91 kg/m².         Physical Exam  Vitals reviewed.   Constitutional:       Appearance: Normal appearance. He is well-developed.   Cardiovascular:      Rate and Rhythm: Normal rate and regular rhythm.      Heart sounds: Normal heart sounds. No murmur heard.  Pulmonary:      Effort: Pulmonary effort is normal.      Breath sounds: Normal breath sounds.   Neurological:      Mental Status: He is alert and oriented to person, place, and time.      Cranial Nerves: No cranial nerve deficit.      Motor: No weakness.   Psychiatric:         Mood and Affect: Mood and affect normal.             Result Review :     The following data was reviewed by: RIO Dent on 05/15/2023:    Common Labs   Common labs        5/25/2022    09:36 1/12/2023    12:53   Common Labs   Glucose 94      BUN 9      Creatinine 1.02      Sodium 138      Potassium 4.2      Chloride 105      Calcium 9.4      Albumin 4.70      Total Bilirubin 0.4      Alkaline Phosphatase 88      AST (SGOT) 20      ALT (SGPT) 28      WBC 6.04   6.17        Hemoglobin 14.1   13.4        Hematocrit 43.4   43.1        Platelets 364   421        Total Cholesterol 163      Triglycerides 166      HDL Cholesterol 47      LDL Cholesterol  87          This result is from an external source.                    Assessment and Plan      Diagnoses and all orders for this visit:    1. Primary hypertension (Primary)  -     lisinopril (PRINIVIL,ZESTRIL) 5 MG tablet; Take 1 " tablet by mouth 2 (Two) Times a Day.  Dispense: 180 tablet; Refill: 1    2. Seasonal allergic rhinitis due to other allergic trigger  -     montelukast (SINGULAIR) 10 MG tablet; Take 1 tablet by mouth Every Evening.  Dispense: 90 tablet; Refill: 1    3. Screening PSA (prostate specific antigen)  -     PSA Screen            Follow Up     Return in about 6 months (around 11/15/2023).  Follow-up in 6 months for labs and appt. Call with any concerns or questions that you may have regarding your medications or history.    We will adjust the BP med.  We will check PSA today.    Patient was given instructions and counseling regarding his condition or for health maintenance advice. Please see specific information pulled into the AVS if appropriate.     Parts of this note are electronic transcriptions/translations of spoken language to printed text using the Dragon Dictation system.          Libra Alvarez, APRN  05/15/2023

## 2023-05-17 ENCOUNTER — TELEPHONE (OUTPATIENT)
Dept: FAMILY MEDICINE CLINIC | Facility: CLINIC | Age: 53
End: 2023-05-17
Payer: COMMERCIAL

## 2023-05-17 DIAGNOSIS — R97.20 ELEVATED PSA: Primary | ICD-10-CM

## 2023-05-17 NOTE — TELEPHONE ENCOUNTER
PT said he can meet and talk to urologist. He has done some research and he would prefer to have his labs re tested or possibly get an MRI or needs an antibiotic because maybe he has a UTI.

## 2023-05-18 RX ORDER — SULFAMETHOXAZOLE AND TRIMETHOPRIM 800; 160 MG/1; MG/1
1 TABLET ORAL 2 TIMES DAILY
Qty: 20 TABLET | Refills: 0 | Status: SHIPPED | OUTPATIENT
Start: 2023-05-18 | End: 2023-05-28

## 2023-06-08 ENCOUNTER — LAB (OUTPATIENT)
Dept: FAMILY MEDICINE CLINIC | Facility: CLINIC | Age: 53
End: 2023-06-08
Payer: COMMERCIAL

## 2023-06-08 DIAGNOSIS — R97.20 ELEVATED PSA: ICD-10-CM

## 2023-06-08 LAB — PSA SERPL-MCNC: 2.7 NG/ML (ref 0–4)

## 2023-06-08 PROCEDURE — 84153 ASSAY OF PSA TOTAL: CPT | Performed by: NURSE PRACTITIONER

## 2023-11-07 DIAGNOSIS — J30.89 SEASONAL ALLERGIC RHINITIS DUE TO OTHER ALLERGIC TRIGGER: ICD-10-CM

## 2023-11-07 DIAGNOSIS — I10 PRIMARY HYPERTENSION: ICD-10-CM

## 2023-11-07 RX ORDER — LISINOPRIL 5 MG/1
5 TABLET ORAL 2 TIMES DAILY
Qty: 180 TABLET | Refills: 1 | OUTPATIENT
Start: 2023-11-07

## 2023-11-07 RX ORDER — MONTELUKAST SODIUM 10 MG/1
10 TABLET ORAL EVERY EVENING
Qty: 90 TABLET | Refills: 1 | OUTPATIENT
Start: 2023-11-07

## 2023-11-15 ENCOUNTER — OFFICE VISIT (OUTPATIENT)
Dept: FAMILY MEDICINE CLINIC | Facility: CLINIC | Age: 53
End: 2023-11-15
Payer: COMMERCIAL

## 2023-11-15 VITALS
HEART RATE: 112 BPM | TEMPERATURE: 99.4 F | BODY MASS INDEX: 29.13 KG/M2 | RESPIRATION RATE: 18 BRPM | DIASTOLIC BLOOD PRESSURE: 80 MMHG | WEIGHT: 196.69 LBS | SYSTOLIC BLOOD PRESSURE: 124 MMHG | HEIGHT: 69 IN | OXYGEN SATURATION: 98 %

## 2023-11-15 DIAGNOSIS — I10 PRIMARY HYPERTENSION: Primary | ICD-10-CM

## 2023-11-15 DIAGNOSIS — J30.89 SEASONAL ALLERGIC RHINITIS DUE TO OTHER ALLERGIC TRIGGER: ICD-10-CM

## 2023-11-15 DIAGNOSIS — K21.9 GASTROESOPHAGEAL REFLUX DISEASE WITHOUT ESOPHAGITIS: ICD-10-CM

## 2023-11-15 DIAGNOSIS — E66.3 OVERWEIGHT: ICD-10-CM

## 2023-11-15 RX ORDER — CETIRIZINE HYDROCHLORIDE 10 MG/1
10 TABLET ORAL DAILY
Qty: 90 TABLET | Refills: 1 | Status: SHIPPED | OUTPATIENT
Start: 2023-11-15

## 2023-11-15 RX ORDER — MONTELUKAST SODIUM 10 MG/1
10 TABLET ORAL EVERY EVENING
Qty: 90 TABLET | Refills: 1 | Status: SHIPPED | OUTPATIENT
Start: 2023-11-15

## 2023-11-15 RX ORDER — OMEPRAZOLE 20 MG/1
20 CAPSULE, DELAYED RELEASE ORAL DAILY
Qty: 90 CAPSULE | Refills: 1 | Status: SHIPPED | OUTPATIENT
Start: 2023-11-15

## 2023-11-15 RX ORDER — LISINOPRIL 5 MG/1
5 TABLET ORAL 2 TIMES DAILY
Qty: 180 TABLET | Refills: 1 | Status: SHIPPED | OUTPATIENT
Start: 2023-11-15

## 2023-11-15 NOTE — PROGRESS NOTES
Answers submitted by the patient for this visit:  Primary Reason for Visit (Submitted on 11/10/2023)  What is the primary reason for your visit?: High Blood Pressure  Chief Complaint  Hypertension    Subjective          Marcelino Ogden presents to [facility]  History of Present Illness  HTN: He is doing good with his lsinopril.  He is due his PE with [facility] soon.  Since 6 years of age using tobacco and not ready to stop.      He will get labs with [facility]  Allergies;  he takes his meds daily and is doing well.    Depression: Not at risk (5/15/2023)    PHQ-2     PHQ-2 Score: 0                  Allergies  Silicone    Social History     Tobacco Use    Smoking status: Never    Smokeless tobacco: Current     Types: Chew    Tobacco comments:     Smokeless   Vaping Use    Vaping Use: Never used   Substance Use Topics    Alcohol use: Not Currently     Comment: Quit 15 years ago    Drug use: Never       Family History   Problem Relation Age of Onset    Other Mother         breast cancer then 7 years later appendix,lung and colon    Cancer Mother     Stroke Father     Kidney nephrosis Father     Other Paternal Grandmother         chf        Health Maintenance Due   Topic Date Due    ANNUAL PHYSICAL  Never done        Immunization History   Administered Date(s) Administered    COVID-19 (PFIZER) BIVALENT 12+YRS 10/31/2022    COVID-19 (PFIZER) Purple Cap Monovalent 03/19/2021, 04/16/2021    COVID-19 F23 (MODERNA) 12YRS+ (SPIKEVAX) 10/13/2023    Flu Vaccine Intradermal Quad 18-64YR 10/13/2021    Flu Vaccine Quad PF >36MO 10/21/2019, 10/26/2020    Flu Vaccine Split Quad 10/18/2019    Hepatitis A 12/17/2019    Hepatitis B Adult/Adolescent IM 01/01/2006    Influenza Injectable Mdck Pf Quad 10/31/2022    Shingrix 12/07/2021, 02/28/2022    Tdap 10/01/2019       Review of Systems     Objective       Vitals:    11/15/23 1453   BP: 124/80   Pulse: 112   Resp: 18   Temp: 99.4 °F (37.4 °C)   SpO2: 98%  "  Weight: 89.2 kg (196 lb 11 oz)   Height: 175.3 cm (69\")       Body mass index is 29.05 kg/m².         Physical Exam        Result Review :     The following data was reviewed by: RIO Dent on 11/15/2023:    Common Labs   Common labs          1/12/2023    12:53 5/15/2023    16:10 6/8/2023    10:09   Common Labs   WBC 6.17         Hemoglobin 13.4         Hematocrit 43.1         Platelets 421         PSA  4.380  2.700       Details          This result is from an external source.                            Assessment and Plan      Diagnoses and all orders for this visit:    1. Primary hypertension (Primary)  -     lisinopril (PRINIVIL,ZESTRIL) 5 MG tablet; Take 1 tablet by mouth 2 (Two) Times a Day.  Dispense: 180 tablet; Refill: 1    2. Seasonal allergic rhinitis due to other allergic trigger  -     montelukast (SINGULAIR) 10 MG tablet; Take 1 tablet by mouth Every Evening.  Dispense: 90 tablet; Refill: 1  -     cetirizine (zyrTEC) 10 MG tablet; Take 1 tablet by mouth Daily.  Dispense: 90 tablet; Refill: 1    3. Overweight    4. Gastroesophageal reflux disease without esophagitis  -     omeprazole (priLOSEC) 20 MG capsule; Take 1 capsule by mouth Daily.  Dispense: 90 capsule; Refill: 1            Follow Up     Return in about 6 months (around 5/15/2024).  Follow-up in 6 months for labs and appt. Call with any concerns or questions that you may have regarding your medications or history.    I have reviewed all medications and at this time no medications changes need to be adjusted for all chronic conditions.  He will get his labs in [place] at  and if not we will do further and need to do PSA at that time.    Patient was given instructions and counseling regarding his condition or for health maintenance advice. Please see specific information pulled into the AVS if appropriate.     Parts of this note are electronic transcriptions/translations of spoken language to printed text using the Dragon " Dictation system.          Libra Alvarez, APRN  11/15/2023

## 2024-05-06 DIAGNOSIS — J30.89 SEASONAL ALLERGIC RHINITIS DUE TO OTHER ALLERGIC TRIGGER: ICD-10-CM

## 2024-05-06 DIAGNOSIS — I10 PRIMARY HYPERTENSION: ICD-10-CM

## 2024-05-06 DIAGNOSIS — K21.9 GASTROESOPHAGEAL REFLUX DISEASE WITHOUT ESOPHAGITIS: ICD-10-CM

## 2024-05-06 RX ORDER — CETIRIZINE HYDROCHLORIDE 10 MG/1
10 TABLET ORAL DAILY
Qty: 90 TABLET | Refills: 1 | OUTPATIENT
Start: 2024-05-06

## 2024-05-06 RX ORDER — OMEPRAZOLE 20 MG/1
20 CAPSULE, DELAYED RELEASE ORAL DAILY
Qty: 90 CAPSULE | Refills: 1 | OUTPATIENT
Start: 2024-05-06

## 2024-05-06 RX ORDER — LISINOPRIL 5 MG/1
5 TABLET ORAL 2 TIMES DAILY
Qty: 180 TABLET | Refills: 1 | OUTPATIENT
Start: 2024-05-06

## 2024-05-06 RX ORDER — MONTELUKAST SODIUM 10 MG/1
10 TABLET ORAL EVERY EVENING
Qty: 90 TABLET | Refills: 1 | OUTPATIENT
Start: 2024-05-06

## 2024-05-15 ENCOUNTER — OFFICE VISIT (OUTPATIENT)
Dept: FAMILY MEDICINE CLINIC | Facility: CLINIC | Age: 54
End: 2024-05-15
Payer: COMMERCIAL

## 2024-05-15 VITALS
SYSTOLIC BLOOD PRESSURE: 122 MMHG | WEIGHT: 198.7 LBS | DIASTOLIC BLOOD PRESSURE: 88 MMHG | TEMPERATURE: 97.9 F | RESPIRATION RATE: 16 BRPM | HEIGHT: 69 IN | HEART RATE: 80 BPM | OXYGEN SATURATION: 98 % | BODY MASS INDEX: 29.43 KG/M2
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
## 2024-05-15 DIAGNOSIS — K21.9 GASTROESOPHAGEAL REFLUX DISEASE WITHOUT ESOPHAGITIS: ICD-10-CM

## 2024-05-15 DIAGNOSIS — J30.89 SEASONAL ALLERGIC RHINITIS DUE TO OTHER ALLERGIC TRIGGER: ICD-10-CM

## 2024-05-15 DIAGNOSIS — M25.50 ARTHRALGIA, UNSPECIFIED JOINT: ICD-10-CM

## 2024-05-15 DIAGNOSIS — I10 PRIMARY HYPERTENSION: Primary | ICD-10-CM

## 2024-05-15 DIAGNOSIS — R79.89 ELEVATED PLATELET COUNT: ICD-10-CM

## 2024-05-15 DIAGNOSIS — D64.9 ANEMIA, UNSPECIFIED TYPE: ICD-10-CM

## 2024-05-15 LAB
BASOPHILS # BLD AUTO: 0.05 10*3/MM3 (ref 0–0.2)
BASOPHILS NFR BLD AUTO: 0.9 % (ref 0–1.5)
DEPRECATED RDW RBC AUTO: 46.3 FL (ref 37–54)
EOSINOPHIL # BLD AUTO: 0.16 10*3/MM3 (ref 0–0.4)
EOSINOPHIL NFR BLD AUTO: 2.8 % (ref 0.3–6.2)
ERYTHROCYTE [DISTWIDTH] IN BLOOD BY AUTOMATED COUNT: 16.2 % (ref 12.3–15.4)
FOLATE SERPL-MCNC: 19 NG/ML (ref 4.78–24.2)
HCT VFR BLD AUTO: 39.7 % (ref 37.5–51)
HGB BLD-MCNC: 11.9 G/DL (ref 13–17.7)
IMM GRANULOCYTES # BLD AUTO: 0.01 10*3/MM3 (ref 0–0.05)
IMM GRANULOCYTES NFR BLD AUTO: 0.2 % (ref 0–0.5)
IRON 24H UR-MRATE: 14 MCG/DL (ref 59–158)
IRON SATN MFR SERPL: 2 % (ref 20–50)
LYMPHOCYTES # BLD AUTO: 1.79 10*3/MM3 (ref 0.7–3.1)
LYMPHOCYTES NFR BLD AUTO: 31.3 % (ref 19.6–45.3)
MCH RBC QN AUTO: 23.8 PG (ref 26.6–33)
MCHC RBC AUTO-ENTMCNC: 30 G/DL (ref 31.5–35.7)
MCV RBC AUTO: 79.4 FL (ref 79–97)
MONOCYTES # BLD AUTO: 0.61 10*3/MM3 (ref 0.1–0.9)
MONOCYTES NFR BLD AUTO: 10.7 % (ref 5–12)
NEUTROPHILS NFR BLD AUTO: 3.1 10*3/MM3 (ref 1.7–7)
NEUTROPHILS NFR BLD AUTO: 54.1 % (ref 42.7–76)
NRBC BLD AUTO-RTO: 0 /100 WBC (ref 0–0.2)
PLATELET # BLD AUTO: 458 10*3/MM3 (ref 140–450)
PMV BLD AUTO: 10.4 FL (ref 6–12)
RBC # BLD AUTO: 5 10*6/MM3 (ref 4.14–5.8)
TIBC SERPL-MCNC: 611 MCG/DL (ref 298–536)
TRANSFERRIN SERPL-MCNC: 410 MG/DL (ref 200–360)
VIT B12 BLD-MCNC: 1186 PG/ML (ref 211–946)
WBC NRBC COR # BLD AUTO: 5.72 10*3/MM3 (ref 3.4–10.8)

## 2024-05-15 PROCEDURE — 86666 EHRLICHIA ANTIBODY: CPT | Performed by: NURSE PRACTITIONER

## 2024-05-15 PROCEDURE — 84466 ASSAY OF TRANSFERRIN: CPT | Performed by: NURSE PRACTITIONER

## 2024-05-15 PROCEDURE — 82607 VITAMIN B-12: CPT | Performed by: NURSE PRACTITIONER

## 2024-05-15 PROCEDURE — 86757 RICKETTSIA ANTIBODY: CPT | Performed by: NURSE PRACTITIONER

## 2024-05-15 PROCEDURE — 82746 ASSAY OF FOLIC ACID SERUM: CPT | Performed by: NURSE PRACTITIONER

## 2024-05-15 PROCEDURE — 83540 ASSAY OF IRON: CPT | Performed by: NURSE PRACTITIONER

## 2024-05-15 PROCEDURE — 86753 PROTOZOA ANTIBODY NOS: CPT | Performed by: NURSE PRACTITIONER

## 2024-05-15 PROCEDURE — 85025 COMPLETE CBC W/AUTO DIFF WBC: CPT | Performed by: NURSE PRACTITIONER

## 2024-05-15 PROCEDURE — 86618 LYME DISEASE ANTIBODY: CPT | Performed by: NURSE PRACTITIONER

## 2024-05-15 PROCEDURE — 99214 OFFICE O/P EST MOD 30 MIN: CPT | Performed by: NURSE PRACTITIONER

## 2024-05-15 RX ORDER — MONTELUKAST SODIUM 10 MG/1
10 TABLET ORAL EVERY EVENING
Qty: 90 TABLET | Refills: 1 | Status: SHIPPED | OUTPATIENT
Start: 2024-05-15

## 2024-05-15 RX ORDER — OMEPRAZOLE 20 MG/1
20 CAPSULE, DELAYED RELEASE ORAL DAILY
Qty: 90 CAPSULE | Refills: 1 | Status: SHIPPED | OUTPATIENT
Start: 2024-05-15

## 2024-05-15 RX ORDER — CETIRIZINE HYDROCHLORIDE 10 MG/1
10 TABLET ORAL DAILY
Qty: 90 TABLET | Refills: 1 | Status: SHIPPED | OUTPATIENT
Start: 2024-05-15

## 2024-05-15 RX ORDER — BETAMETHASONE DIPROPIONATE 0.5 MG/G
1 CREAM TOPICAL 2 TIMES DAILY
Qty: 45 G | Refills: 0 | Status: SHIPPED | OUTPATIENT
Start: 2024-05-15

## 2024-05-15 RX ORDER — LISINOPRIL 5 MG/1
5 TABLET ORAL 2 TIMES DAILY
Qty: 180 TABLET | Refills: 1 | Status: SHIPPED | OUTPATIENT
Start: 2024-05-15

## 2024-05-16 LAB — B BURGDOR IGG+IGM SER QL IA: NEGATIVE

## 2024-05-16 NOTE — PROGRESS NOTES
Answers submitted by the patient for this visit:  Primary Reason for Visit (Submitted on 5/8/2024)  What is the primary reason for your visit?: High Blood Pressure  High Blood Pressure Questionnaire (Submitted on 5/8/2024)  Chief Complaint: Hypertension  Chronicity: chronic  Onset: more than 1 year ago  Progression since onset: worse  anxiety: No  blurred vision: No  chest pain: No  headaches: No  malaise/fatigue: No  orthopnea: No  palpitations: No  peripheral edema: No  shortness of breath: No  Compliance problems: exercise  Chief Complaint  Hypertension    Subjective          Marcelino Ogden presents to [facility]  Hypertension  Pertinent negatives include no blurred vision, chest pain, palpitations or shortness of breath.   He is here for his blood pressure medicine to be refilled.  He is doing well with his current medication.  No chest pain or shortness of breath noted.  He is taking his acid reflux med with no issues.  No nausea vomiting.  He has pulled 4 ticks off him within the last week or 2.  They have all been around his house.  He had his joint pain and some achiness.  He also has an area on the right lower lip that the [facility] told him to have the dentist look at but she has not been there yet.  The area has been there for a while.  He has been taking B12 and Coricidin daily cold and flu for drainage.  I did review his [facility] paperwork and it is scanned in.    Depression: Not at risk (5/15/2024)    PHQ-2     PHQ-2 Score: 0    and 5/15/2024               Allergies  Silicone    Social History     Tobacco Use    Smoking status: Never    Smokeless tobacco: Current     Types: Chew    Tobacco comments:     Smokeless   Vaping Use    Vaping status: Never Used   Substance Use Topics    Alcohol use: Not Currently     Comment: Quit 15 years ago    Drug use: Never       Family History   Problem Relation Age of Onset    Other Mother         breast  "cancer then 7 years later appendix,lung and colon    Cancer Mother     Stroke Father     Kidney nephrosis Father     Arthritis Father     Other Paternal Grandmother         chf        Health Maintenance Due   Topic Date Due    ANNUAL PHYSICAL  Never done        Immunization History   Administered Date(s) Administered    COVID-19 (PFIZER) BIVALENT 12+YRS 10/31/2022    COVID-19 (PFIZER) Purple Cap Monovalent 03/19/2021, 04/16/2021    COVID-19 F23 (MODERNA) 12YRS+ (SPIKEVAX) 10/13/2023    Flu Vaccine Intradermal Quad 18-64YR 10/13/2021    Flu Vaccine Quad PF >36MO 10/21/2019, 10/26/2020    Flu Vaccine Split Quad 10/18/2019    Hepatitis A 12/17/2019    Hepatitis B Adult/Adolescent IM 01/01/2006    Influenza Injectable Mdck Pf Quad 10/31/2022    Shingrix 12/07/2021, 02/28/2022    Tdap 10/01/2019       Review of Systems   Constitutional:  Positive for fatigue. Negative for fever.   Eyes:  Negative for blurred vision.   Respiratory:  Negative for shortness of breath.    Cardiovascular:  Negative for chest pain and palpitations.   He has been taking    Objective       Vitals:    05/15/24 1359 05/15/24 1406   BP: 144/87 122/88   Pulse: 80    Resp: 16    Temp: 97.9 °F (36.6 °C)    SpO2: 98%    Weight: 90.1 kg (198 lb 11.2 oz)    Height: 174 cm (68.5\")        Body mass index is 29.77 kg/m².         Physical Exam  Vitals reviewed.   Constitutional:       Appearance: Normal appearance. He is well-developed.   HENT:      Head:        Comments: There is an area on the bottom of the right hand that that looks like a cyst like area and he will follow-up with the dentist or oral surgeon.  Cardiovascular:      Rate and Rhythm: Normal rate and regular rhythm.      Heart sounds: Normal heart sounds. No murmur heard.  Pulmonary:      Effort: Pulmonary effort is normal.      Breath sounds: Normal breath sounds.   Skin:     Comments: Slightly red circular area noted on the left inner groin.  No bull's-eye noted.  There is a tick bite noted " to the right about approximately 2 to 3 inches from the bellybutton which is healing.  There is also another tick bite noted on the right waist posterior which is healed.  He also has 1 more tick bite noted on the right outer aspect of the ankle which has scarred.   Neurological:      Mental Status: He is alert and oriented to person, place, and time.      Cranial Nerves: No cranial nerve deficit.      Motor: No weakness.   Psychiatric:         Mood and Affect: Mood and affect normal.         He is here for his blood pressure.  He needs a refill of his      Result Review :     The following data was reviewed by: RIO Dent on 05/15/2024:    Common Labs   Common labs          6/8/2023    10:09 3/20/2024    12:28   Common Labs   WBC  6.02       Hemoglobin  11.4       Hematocrit  40.3       Platelets  499       PSA 2.700        Details          This result is from an external source.                   XR Chest 2 View    Result Date: 3/20/2024  No focal airspace consolidation. Unchanged retrocardiac rounded opacity. Recommend further evaluation with noncontrast chest tube. CRITICAL RESULT:   No. COMMUNICATION: Per this written report. By electronically signing this report, I, the attending physician, attest that I have personally reviewed the images/data for the above examination(s) and agree with the final edited report. Drafted by Jv Martinez DO on 3/20/2024 4:22 PM Final report signed by Joshua Larkin MD on 3/20/2024 4:58 PM                Assessment and Plan      Assessment & Plan  Primary hypertension  Continue on same medication as blood pressure is well-controlled today.  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  He will continue with current  medication  Elevated platelet count  He has labs done with [facility] as always for his job and his platelets were elevated well over 400 and we will recheck today.  Tick bite, unspecified site, initial encounter  He is pulled for ticks off already.  He has 1 in left groin which was about a week ago and then he has 1 on the right posterior waistline 1 on the abdomen and 1 on the right ankle which all are doing okay except for the one in the left groin that has some redness but it is not raised or bull's-eye.  Arthralgia, unspecified joint      Orders Placed This Encounter   Procedures    CBC Auto Differential    Spotted Fever Group AB, IgG/IgM    Babesia Microti Antibody Panel    Iron Profile    Vitamin B12 & Folate    Ehrlichia Antibody Panel    Lyme Disease Total Antibody With Reflex to Immunoassay     New Medications Ordered This Visit   Medications    omeprazole (priLOSEC) 20 MG capsule     Sig: Take 1 capsule by mouth Daily.     Dispense:  90 capsule     Refill:  1    montelukast (SINGULAIR) 10 MG tablet     Sig: Take 1 tablet by mouth Every Evening.     Dispense:  90 tablet     Refill:  1    lisinopril (PRINIVIL,ZESTRIL) 5 MG tablet     Sig: Take 1 tablet by mouth 2 (Two) Times a Day.     Dispense:  180 tablet     Refill:  1    cetirizine (zyrTEC) 10 MG tablet     Sig: Take 1 tablet by mouth Daily.     Dispense:  90 tablet     Refill:  1    betamethasone dipropionate (DIPROSONE) 0.05 % cream     Sig: Apply 1 Application topically to the appropriate area as directed 2 (Two) Times a Day.     Dispense:  45 g     Refill:  0          Diagnosis Plan   1. Primary hypertension  lisinopril (PRINIVIL,ZESTRIL) 5 MG tablet      2. Gastroesophageal reflux disease without esophagitis  omeprazole (priLOSEC) 20 MG capsule      3. Seasonal allergic rhinitis due to other allergic trigger  montelukast (SINGULAIR) 10 MG tablet    cetirizine (zyrTEC) 10 MG tablet      4. Elevated platelet count  CBC Auto Differential       5. Tick bite, unspecified site, initial encounter  CBC Auto Differential    betamethasone dipropionate (DIPROSONE) 0.05 % cream    Babesia Microti Antibody Panel    Ehrlichia Antibody Panel    Lyme Disease Total Antibody With Reflex to Immunoassay      6. Arthralgia, unspecified joint  Spotted Fever Group AB, IgG/IgM    Babesia Microti Antibody Panel    Iron Profile    Vitamin B12 & Folate    Ehrlichia Antibody Panel    Lyme Disease Total Antibody With Reflex to Immunoassay                Follow Up     Return in about 6 months (around 11/15/2024).  Follow-up in 6 months for labs and appt. Call with any concerns or questions that you may have regarding your medications or history.    I have reviewed all medications and at this time no medications changes need to be adjusted for all chronic conditions.    Patient was given instructions and counseling regarding his condition or for health maintenance advice. Please see specific information pulled into the AVS if appropriate.     Parts of this note are electronic transcriptions/translations of spoken language to printed text using the Dragon Dictation system.          Libra Alvarez, APRN  05/15/2024

## 2024-05-16 NOTE — ASSESSMENT & PLAN NOTE
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  He will continue with current medication

## 2024-05-17 ENCOUNTER — PATIENT ROUNDING (BHMG ONLY) (OUTPATIENT)
Dept: FAMILY MEDICINE CLINIC | Facility: CLINIC | Age: 54
End: 2024-05-17
Payer: COMMERCIAL

## 2024-05-17 LAB
A PHAGOCYTOPH IGG TITR SER IF: NEGATIVE {TITER}
A PHAGOCYTOPH IGM TITR SER IF: NEGATIVE {TITER}
B MICROTI IGG TITR SER: NORMAL {TITER}
B MICROTI IGM TITR SER: NORMAL {TITER}
E CHAFFEENSIS IGG TITR SER IF: NEGATIVE {TITER}
E CHAFFEENSIS IGM TITR SER IF: NEGATIVE {TITER}
RESULT COMMENT:: NORMAL
RESULT COMMENT:: NORMAL

## 2024-05-17 NOTE — PROGRESS NOTES
A My-Chart message has been sent to the patient for PATIENT ROUNDING with [facility].

## 2024-05-21 LAB
RESULT COMMENT:: NORMAL
RICK SF IGG TITR SER: NORMAL {TITER}
RICK SF IGM TITR SER: NORMAL {TITER}

## 2024-07-16 ENCOUNTER — LAB (OUTPATIENT)
Dept: FAMILY MEDICINE CLINIC | Facility: CLINIC | Age: 54
End: 2024-07-16
Payer: COMMERCIAL

## 2024-07-16 DIAGNOSIS — D64.9 ANEMIA, UNSPECIFIED TYPE: ICD-10-CM

## 2024-07-16 LAB
IRON 24H UR-MRATE: 152 MCG/DL (ref 59–158)
IRON SATN MFR SERPL: 38 % (ref 20–50)
TIBC SERPL-MCNC: 396 MCG/DL (ref 298–536)
TRANSFERRIN SERPL-MCNC: 266 MG/DL (ref 200–360)

## 2024-07-16 PROCEDURE — 84466 ASSAY OF TRANSFERRIN: CPT | Performed by: NURSE PRACTITIONER

## 2024-07-16 PROCEDURE — 36415 COLL VENOUS BLD VENIPUNCTURE: CPT | Performed by: NURSE PRACTITIONER

## 2024-07-16 PROCEDURE — 83540 ASSAY OF IRON: CPT | Performed by: NURSE PRACTITIONER

## 2024-11-01 DIAGNOSIS — J30.89 SEASONAL ALLERGIC RHINITIS DUE TO OTHER ALLERGIC TRIGGER: ICD-10-CM

## 2024-11-01 DIAGNOSIS — I10 PRIMARY HYPERTENSION: ICD-10-CM

## 2024-11-01 DIAGNOSIS — K21.9 GASTROESOPHAGEAL REFLUX DISEASE WITHOUT ESOPHAGITIS: ICD-10-CM

## 2024-11-01 RX ORDER — MONTELUKAST SODIUM 10 MG/1
10 TABLET ORAL EVERY EVENING
Qty: 90 TABLET | Refills: 1 | OUTPATIENT
Start: 2024-11-01

## 2024-11-01 RX ORDER — MONTELUKAST SODIUM 10 MG/1
10 TABLET ORAL EVERY EVENING
Qty: 30 TABLET | Refills: 0 | Status: SHIPPED | OUTPATIENT
Start: 2024-11-01

## 2024-11-01 RX ORDER — CETIRIZINE HYDROCHLORIDE 10 MG/1
10 TABLET ORAL DAILY
Qty: 90 TABLET | Refills: 1 | OUTPATIENT
Start: 2024-11-01

## 2024-11-01 RX ORDER — LISINOPRIL 5 MG/1
5 TABLET ORAL 2 TIMES DAILY
Qty: 60 TABLET | Refills: 0 | Status: SHIPPED | OUTPATIENT
Start: 2024-11-01

## 2024-11-01 RX ORDER — CETIRIZINE HYDROCHLORIDE 10 MG/1
10 TABLET ORAL DAILY
Qty: 30 TABLET | Refills: 0 | Status: SHIPPED | OUTPATIENT
Start: 2024-11-01

## 2024-11-01 RX ORDER — LISINOPRIL 5 MG/1
5 TABLET ORAL 2 TIMES DAILY
Qty: 180 TABLET | Refills: 1 | OUTPATIENT
Start: 2024-11-01

## 2024-11-20 ENCOUNTER — OFFICE VISIT (OUTPATIENT)
Dept: FAMILY MEDICINE CLINIC | Facility: CLINIC | Age: 54
End: 2024-11-20
Payer: COMMERCIAL

## 2024-11-20 VITALS
WEIGHT: 199.5 LBS | BODY MASS INDEX: 29.55 KG/M2 | HEART RATE: 93 BPM | RESPIRATION RATE: 18 BRPM | DIASTOLIC BLOOD PRESSURE: 88 MMHG | OXYGEN SATURATION: 95 % | SYSTOLIC BLOOD PRESSURE: 138 MMHG | TEMPERATURE: 98.5 F | HEIGHT: 69 IN

## 2024-11-20 DIAGNOSIS — D50.9 IRON DEFICIENCY ANEMIA, UNSPECIFIED IRON DEFICIENCY ANEMIA TYPE: Primary | ICD-10-CM

## 2024-11-20 DIAGNOSIS — J30.89 SEASONAL ALLERGIC RHINITIS DUE TO OTHER ALLERGIC TRIGGER: ICD-10-CM

## 2024-11-20 DIAGNOSIS — Z12.5 SCREENING PSA (PROSTATE SPECIFIC ANTIGEN): ICD-10-CM

## 2024-11-20 DIAGNOSIS — K21.9 GASTROESOPHAGEAL REFLUX DISEASE WITHOUT ESOPHAGITIS: ICD-10-CM

## 2024-11-20 DIAGNOSIS — I10 PRIMARY HYPERTENSION: ICD-10-CM

## 2024-11-20 LAB
BASOPHILS # BLD AUTO: 0.06 10*3/MM3 (ref 0–0.2)
BASOPHILS NFR BLD AUTO: 0.8 % (ref 0–1.5)
DEPRECATED RDW RBC AUTO: 43.8 FL (ref 37–54)
EOSINOPHIL # BLD AUTO: 0.09 10*3/MM3 (ref 0–0.4)
EOSINOPHIL NFR BLD AUTO: 1.3 % (ref 0.3–6.2)
ERYTHROCYTE [DISTWIDTH] IN BLOOD BY AUTOMATED COUNT: 12.1 % (ref 12.3–15.4)
HCT VFR BLD AUTO: 49.6 % (ref 37.5–51)
HGB BLD-MCNC: 16.4 G/DL (ref 13–17.7)
IMM GRANULOCYTES # BLD AUTO: 0.03 10*3/MM3 (ref 0–0.05)
IMM GRANULOCYTES NFR BLD AUTO: 0.4 % (ref 0–0.5)
LYMPHOCYTES # BLD AUTO: 2.03 10*3/MM3 (ref 0.7–3.1)
LYMPHOCYTES NFR BLD AUTO: 28.7 % (ref 19.6–45.3)
MCH RBC QN AUTO: 32.5 PG (ref 26.6–33)
MCHC RBC AUTO-ENTMCNC: 33.1 G/DL (ref 31.5–35.7)
MCV RBC AUTO: 98.4 FL (ref 79–97)
MONOCYTES # BLD AUTO: 0.65 10*3/MM3 (ref 0.1–0.9)
MONOCYTES NFR BLD AUTO: 9.2 % (ref 5–12)
NEUTROPHILS NFR BLD AUTO: 4.22 10*3/MM3 (ref 1.7–7)
NEUTROPHILS NFR BLD AUTO: 59.6 % (ref 42.7–76)
NRBC BLD AUTO-RTO: 0 /100 WBC (ref 0–0.2)
PLATELET # BLD AUTO: 334 10*3/MM3 (ref 140–450)
PMV BLD AUTO: 10 FL (ref 6–12)
RBC # BLD AUTO: 5.04 10*6/MM3 (ref 4.14–5.8)
WBC NRBC COR # BLD AUTO: 7.08 10*3/MM3 (ref 3.4–10.8)

## 2024-11-20 PROCEDURE — 84550 ASSAY OF BLOOD/URIC ACID: CPT | Performed by: NURSE PRACTITIONER

## 2024-11-20 PROCEDURE — G0103 PSA SCREENING: HCPCS | Performed by: NURSE PRACTITIONER

## 2024-11-20 PROCEDURE — 82728 ASSAY OF FERRITIN: CPT | Performed by: NURSE PRACTITIONER

## 2024-11-20 PROCEDURE — 80050 GENERAL HEALTH PANEL: CPT | Performed by: NURSE PRACTITIONER

## 2024-11-20 PROCEDURE — 82607 VITAMIN B-12: CPT | Performed by: NURSE PRACTITIONER

## 2024-11-20 PROCEDURE — 83540 ASSAY OF IRON: CPT | Performed by: NURSE PRACTITIONER

## 2024-11-20 PROCEDURE — 80061 LIPID PANEL: CPT | Performed by: NURSE PRACTITIONER

## 2024-11-20 PROCEDURE — 82746 ASSAY OF FOLIC ACID SERUM: CPT | Performed by: NURSE PRACTITIONER

## 2024-11-20 PROCEDURE — 84466 ASSAY OF TRANSFERRIN: CPT | Performed by: NURSE PRACTITIONER

## 2024-11-20 RX ORDER — LISINOPRIL 5 MG/1
5 TABLET ORAL 2 TIMES DAILY
Qty: 180 TABLET | Refills: 1 | Status: SHIPPED | OUTPATIENT
Start: 2024-11-20

## 2024-11-20 RX ORDER — CETIRIZINE HYDROCHLORIDE 10 MG/1
10 TABLET ORAL DAILY
Qty: 90 TABLET | Refills: 1 | Status: SHIPPED | OUTPATIENT
Start: 2024-11-20

## 2024-11-20 RX ORDER — MONTELUKAST SODIUM 10 MG/1
10 TABLET ORAL EVERY EVENING
Qty: 90 TABLET | Refills: 1 | Status: SHIPPED | OUTPATIENT
Start: 2024-11-20

## 2024-11-20 NOTE — ASSESSMENT & PLAN NOTE
Orders:    lisinopril (PRINIVIL,ZESTRIL) 5 MG tablet; Take 1 tablet by mouth 2 (Two) Times a Day.    Comprehensive Metabolic Panel    CBC & Differential    TSH    Lipid Panel    Uric Acid

## 2024-11-20 NOTE — ASSESSMENT & PLAN NOTE
Orders:    cetirizine (zyrTEC) 10 MG tablet; Take 1 tablet by mouth Daily.    montelukast (SINGULAIR) 10 MG tablet; Take 1 tablet by mouth Every Evening.

## 2024-11-20 NOTE — PROGRESS NOTES
Chief Complaint  Hypertension    Subjective          Marcelino Ogden presents to [facility]  History of Present Illness  He is losing the PE from [place] and unsure what will happen.  He is taking his iron.  He is also taking his lisinopril.  He is actually fasting today.  He takes his Singulair and his Zyrtec every day.  He does need a refill.  He is also taking his Prilosec daily.  He said he is not having any issues with his medicines.  He said that again he does not know exactly what is happening in [place] he does not know if someone else will take over for the whole physical or not but today we will do blood work.    Depression: Not at risk (5/15/2024)    PHQ-2     PHQ-2 Score: 0    and     BMI is >= 25 and <30. (Overweight) The following options were offered after discussion;: exercise counseling/recommendations and nutrition counseling/recommendations         Allergies  Silicone    Social History     Tobacco Use    Smoking status: Never    Smokeless tobacco: Current     Types: Chew    Tobacco comments:     Smokeless   Vaping Use    Vaping status: Never Used   Substance Use Topics    Alcohol use: Not Currently     Comment: Quit 15 years ago    Drug use: Never       Family History   Problem Relation Age of Onset    Cancer Mother     Stroke Father     Kidney nephrosis Father     Arthritis Father         Health Maintenance Due   Topic Date Due    ANNUAL PHYSICAL  Never done        Immunization History   Administered Date(s) Administered    COVID-19 (MODERNA) 12YRS+ (SPIKEVAX) 10/13/2023, 11/01/2024    COVID-19 (PFIZER) BIVALENT 12+YRS 10/31/2022    COVID-19 (PFIZER) Purple Cap Monovalent 03/19/2021, 04/16/2021, 12/07/2021    Flu Vaccine Intradermal Quad 18-64YR 10/13/2021    Flu Vaccine Quad PF >36MO 10/21/2019, 10/26/2020    Flu Vaccine Split Quad 10/18/2019    Hepatitis A 12/17/2019    Hepatitis B Adult/Adolescent IM 01/01/2006    Influenza Inj MDCK Preserative Free  "10/17/2024    Influenza Injectable Mdck Pf Quad 10/31/2022    Shingrix 12/07/2021, 02/28/2022    Tdap 10/01/2019       Review of Systems   Eyes:  Negative for blurred vision.   Respiratory:  Negative for shortness of breath.    Cardiovascular:  Negative for chest pain and palpitations.        Objective       Vitals:    11/20/24 1351 11/20/24 1358   BP: 148/95 138/88   Pulse: 93    Resp: 18    Temp: 98.5 °F (36.9 °C)    SpO2: 95%    Weight: 90.5 kg (199 lb 8 oz)    Height: 174 cm (68.5\")        Body mass index is 29.89 kg/m².         Physical Exam  Vitals and nursing note reviewed.   Constitutional:       General: He is not in acute distress.     Appearance: Normal appearance. He is not ill-appearing.   HENT:      Right Ear: Tympanic membrane and ear canal normal.      Left Ear: Tympanic membrane and ear canal normal.      Nose: No congestion or rhinorrhea.      Mouth/Throat:      Pharynx: No oropharyngeal exudate or posterior oropharyngeal erythema.   Eyes:      Conjunctiva/sclera: Conjunctivae normal.   Neck:      Vascular: No carotid bruit.   Cardiovascular:      Rate and Rhythm: Normal rate and regular rhythm.      Heart sounds: No murmur heard.  Pulmonary:      Effort: Pulmonary effort is normal.      Breath sounds: Normal breath sounds. No wheezing or rhonchi.   Musculoskeletal:      Cervical back: No tenderness.   Skin:     Findings: No bruising or rash.   Neurological:      General: No focal deficit present.      Mental Status: He is alert and oriented to person, place, and time. Mental status is at baseline.   Psychiatric:         Mood and Affect: Mood normal.         Behavior: Behavior normal.         Thought Content: Thought content normal.         Judgment: Judgment normal.               Result Review :     The following data was reviewed by: RIO Dent on 11/20/2024:    Common Labs   Common labs          3/20/2024    12:28 5/15/2024    14:55 11/20/2024    14:23   Common Labs   Glucose   " 103    BUN   6    Creatinine   1.04    Sodium   138    Potassium   4.4    Chloride   104    Calcium   9.8    Albumin   4.4    Total Bilirubin   0.4    Alkaline Phosphatase   92    AST (SGOT)   29    ALT (SGPT)   42    WBC 6.02     5.72  7.08    Hemoglobin 11.4     11.9  16.4    Hematocrit 40.3     39.7  49.6    Platelets 499     458  334    Total Cholesterol   185    Triglycerides   147    HDL Cholesterol   60    LDL Cholesterol    99    PSA   2.230    Uric Acid   6.0       Details          This result is from an external source.               We will go ahead and do refills today along with fasting labs.    No Images in the past 120 days found..              Assessment and Plan      Assessment & Plan  Seasonal allergic rhinitis due to other allergic trigger    Orders:    cetirizine (zyrTEC) 10 MG tablet; Take 1 tablet by mouth Daily.    montelukast (SINGULAIR) 10 MG tablet; Take 1 tablet by mouth Every Evening.    Primary hypertension      Orders:    lisinopril (PRINIVIL,ZESTRIL) 5 MG tablet; Take 1 tablet by mouth 2 (Two) Times a Day.    Comprehensive Metabolic Panel    CBC & Differential    TSH    Lipid Panel    Uric Acid    Gastroesophageal reflux disease without esophagitis    Orders:    omeprazole (priLOSEC) 20 MG capsule; Take 1 capsule by mouth Daily.    Iron deficiency anemia, unspecified iron deficiency anemia type    Orders:    Vitamin B12 & Folate    Iron Profile    Ferritin    Screening PSA (prostate specific antigen)    Orders:    PSA Screen       Diagnosis Plan   1. Iron deficiency anemia, unspecified iron deficiency anemia type  Vitamin B12 & Folate    Iron Profile    Ferritin      2. Seasonal allergic rhinitis due to other allergic trigger  cetirizine (zyrTEC) 10 MG tablet    montelukast (SINGULAIR) 10 MG tablet      3. Primary hypertension  lisinopril (PRINIVIL,ZESTRIL) 5 MG tablet    Comprehensive Metabolic Panel    CBC & Differential    TSH    Lipid Panel    Uric Acid      4. Gastroesophageal  reflux disease without esophagitis  omeprazole (priLOSEC) 20 MG capsule      5. Screening PSA (prostate specific antigen)  PSA Screen                Follow Up     Return in about 6 months (around 5/20/2025).  If he is not able to get a full workup like he does in [place] then I will try my best to get all the testing that they do with his next follow-up.  Patient was given instructions and counseling regarding his condition or for health maintenance advice. Please see specific information pulled into the AVS if appropriate.     Parts of this note are electronic transcriptions/translations of spoken language to printed text using the Dragon Dictation system.          Libra Alvarez, APRN  11/20/2024  Answers submitted by the patient for this visit:  Primary Reason for Visit (Submitted on 11/19/2024)  What is the primary reason for your visit?: High Blood Pressure

## 2024-11-21 LAB
ALBUMIN SERPL-MCNC: 4.4 G/DL (ref 3.5–5.2)
ALBUMIN/GLOB SERPL: 1.6 G/DL
ALP SERPL-CCNC: 92 U/L (ref 39–117)
ALT SERPL W P-5'-P-CCNC: 42 U/L (ref 1–41)
ANION GAP SERPL CALCULATED.3IONS-SCNC: 9.9 MMOL/L (ref 5–15)
AST SERPL-CCNC: 29 U/L (ref 1–40)
BILIRUB SERPL-MCNC: 0.4 MG/DL (ref 0–1.2)
BUN SERPL-MCNC: 6 MG/DL (ref 6–20)
BUN/CREAT SERPL: 5.8 (ref 7–25)
CALCIUM SPEC-SCNC: 9.8 MG/DL (ref 8.6–10.5)
CHLORIDE SERPL-SCNC: 104 MMOL/L (ref 98–107)
CHOLEST SERPL-MCNC: 185 MG/DL (ref 0–200)
CO2 SERPL-SCNC: 24.1 MMOL/L (ref 22–29)
CREAT SERPL-MCNC: 1.04 MG/DL (ref 0.76–1.27)
EGFRCR SERPLBLD CKD-EPI 2021: 85.9 ML/MIN/1.73
FERRITIN SERPL-MCNC: 240 NG/ML (ref 30–400)
FOLATE SERPL-MCNC: 15.7 NG/ML (ref 4.78–24.2)
GLOBULIN UR ELPH-MCNC: 2.7 GM/DL
GLUCOSE SERPL-MCNC: 103 MG/DL (ref 65–99)
HDLC SERPL-MCNC: 60 MG/DL (ref 40–60)
IRON 24H UR-MRATE: 132 MCG/DL (ref 59–158)
IRON SATN MFR SERPL: 32 % (ref 20–50)
LDLC SERPL CALC-MCNC: 99 MG/DL (ref 0–100)
LDLC/HDLC SERPL: 1.59 {RATIO}
POTASSIUM SERPL-SCNC: 4.4 MMOL/L (ref 3.5–5.2)
PROT SERPL-MCNC: 7.1 G/DL (ref 6–8.5)
PSA SERPL-MCNC: 2.23 NG/ML (ref 0–4)
SODIUM SERPL-SCNC: 138 MMOL/L (ref 136–145)
TIBC SERPL-MCNC: 414 MCG/DL (ref 298–536)
TRANSFERRIN SERPL-MCNC: 278 MG/DL (ref 200–360)
TRIGL SERPL-MCNC: 147 MG/DL (ref 0–150)
TSH SERPL DL<=0.05 MIU/L-ACNC: 0.87 UIU/ML (ref 0.27–4.2)
URATE SERPL-MCNC: 6 MG/DL (ref 3.4–7)
VIT B12 BLD-MCNC: >2000 PG/ML (ref 211–946)
VLDLC SERPL-MCNC: 26 MG/DL (ref 5–40)

## 2025-05-19 DIAGNOSIS — I10 PRIMARY HYPERTENSION: ICD-10-CM

## 2025-05-19 DIAGNOSIS — J30.89 SEASONAL ALLERGIC RHINITIS DUE TO OTHER ALLERGIC TRIGGER: ICD-10-CM

## 2025-05-19 DIAGNOSIS — K21.9 GASTROESOPHAGEAL REFLUX DISEASE WITHOUT ESOPHAGITIS: ICD-10-CM

## 2025-05-19 RX ORDER — OMEPRAZOLE 20 MG/1
20 CAPSULE, DELAYED RELEASE ORAL DAILY
Qty: 90 CAPSULE | Refills: 1 | OUTPATIENT
Start: 2025-05-19

## 2025-05-19 RX ORDER — CETIRIZINE HYDROCHLORIDE 10 MG/1
10 TABLET ORAL DAILY
Qty: 90 TABLET | Refills: 1 | OUTPATIENT
Start: 2025-05-19

## 2025-05-19 RX ORDER — MONTELUKAST SODIUM 10 MG/1
10 TABLET ORAL EVERY EVENING
Qty: 90 TABLET | Refills: 1 | OUTPATIENT
Start: 2025-05-19

## 2025-05-19 RX ORDER — LISINOPRIL 5 MG/1
5 TABLET ORAL 2 TIMES DAILY
Qty: 180 TABLET | Refills: 1 | OUTPATIENT
Start: 2025-05-19

## 2025-05-21 ENCOUNTER — OFFICE VISIT (OUTPATIENT)
Dept: FAMILY MEDICINE CLINIC | Facility: CLINIC | Age: 55
End: 2025-05-21
Payer: COMMERCIAL

## 2025-05-21 VITALS
WEIGHT: 199.7 LBS | HEIGHT: 69 IN | SYSTOLIC BLOOD PRESSURE: 144 MMHG | BODY MASS INDEX: 29.58 KG/M2 | DIASTOLIC BLOOD PRESSURE: 88 MMHG | HEART RATE: 75 BPM | TEMPERATURE: 98.1 F | OXYGEN SATURATION: 96 %
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
## 2025-05-21 DIAGNOSIS — Z00.00 ANNUAL PHYSICAL EXAM: Primary | ICD-10-CM

## 2025-05-21 DIAGNOSIS — I10 PRIMARY HYPERTENSION: ICD-10-CM

## 2025-05-21 DIAGNOSIS — K21.9 GASTROESOPHAGEAL REFLUX DISEASE WITHOUT ESOPHAGITIS: ICD-10-CM

## 2025-05-21 DIAGNOSIS — J30.89 SEASONAL ALLERGIC RHINITIS DUE TO OTHER ALLERGIC TRIGGER: ICD-10-CM

## 2025-05-21 DIAGNOSIS — D50.9 IRON DEFICIENCY ANEMIA, UNSPECIFIED IRON DEFICIENCY ANEMIA TYPE: ICD-10-CM

## 2025-05-21 LAB
25(OH)D3 SERPL-MCNC: 19.6 NG/ML (ref 30–100)
ALBUMIN SERPL-MCNC: 4.2 G/DL (ref 3.5–5.2)
ALBUMIN/GLOB SERPL: 1.6 G/DL
ALP SERPL-CCNC: 87 U/L (ref 39–117)
ALT SERPL W P-5'-P-CCNC: 26 U/L (ref 1–41)
ANION GAP SERPL CALCULATED.3IONS-SCNC: 11.7 MMOL/L (ref 5–15)
AST SERPL-CCNC: 27 U/L (ref 1–40)
BASOPHILS # BLD AUTO: 0.04 10*3/MM3 (ref 0–0.2)
BASOPHILS NFR BLD AUTO: 0.7 % (ref 0–1.5)
BILIRUB SERPL-MCNC: 0.4 MG/DL (ref 0–1.2)
BUN SERPL-MCNC: 6 MG/DL (ref 6–20)
BUN/CREAT SERPL: 6.4 (ref 7–25)
CALCIUM SPEC-SCNC: 9.3 MG/DL (ref 8.6–10.5)
CHLORIDE SERPL-SCNC: 105 MMOL/L (ref 98–107)
CHOLEST SERPL-MCNC: 179 MG/DL (ref 0–200)
CO2 SERPL-SCNC: 21.3 MMOL/L (ref 22–29)
CREAT SERPL-MCNC: 0.94 MG/DL (ref 0.76–1.27)
DEPRECATED RDW RBC AUTO: 42 FL (ref 37–54)
EGFRCR SERPLBLD CKD-EPI 2021: 96.9 ML/MIN/1.73
EOSINOPHIL # BLD AUTO: 0.07 10*3/MM3 (ref 0–0.4)
EOSINOPHIL NFR BLD AUTO: 1.1 % (ref 0.3–6.2)
ERYTHROCYTE [DISTWIDTH] IN BLOOD BY AUTOMATED COUNT: 11.8 % (ref 12.3–15.4)
FOLATE SERPL-MCNC: 11.8 NG/ML (ref 4.78–24.2)
GLOBULIN UR ELPH-MCNC: 2.6 GM/DL
GLUCOSE SERPL-MCNC: 89 MG/DL (ref 65–99)
HCT VFR BLD AUTO: 48.3 % (ref 37.5–51)
HDLC SERPL-MCNC: 50 MG/DL (ref 40–60)
HGB BLD-MCNC: 16.7 G/DL (ref 13–17.7)
IMM GRANULOCYTES # BLD AUTO: 0.02 10*3/MM3 (ref 0–0.05)
IMM GRANULOCYTES NFR BLD AUTO: 0.3 % (ref 0–0.5)
IRON 24H UR-MRATE: 102 MCG/DL (ref 59–158)
IRON SATN MFR SERPL: 25 % (ref 20–50)
LDLC SERPL CALC-MCNC: 101 MG/DL (ref 0–100)
LDLC/HDLC SERPL: 1.92 {RATIO}
LYMPHOCYTES # BLD AUTO: 1.91 10*3/MM3 (ref 0.7–3.1)
LYMPHOCYTES NFR BLD AUTO: 31.1 % (ref 19.6–45.3)
MCH RBC QN AUTO: 33.3 PG (ref 26.6–33)
MCHC RBC AUTO-ENTMCNC: 34.6 G/DL (ref 31.5–35.7)
MCV RBC AUTO: 96.2 FL (ref 79–97)
MONOCYTES # BLD AUTO: 0.5 10*3/MM3 (ref 0.1–0.9)
MONOCYTES NFR BLD AUTO: 8.1 % (ref 5–12)
NEUTROPHILS NFR BLD AUTO: 3.61 10*3/MM3 (ref 1.7–7)
NEUTROPHILS NFR BLD AUTO: 58.7 % (ref 42.7–76)
NRBC BLD AUTO-RTO: 0 /100 WBC (ref 0–0.2)
PLATELET # BLD AUTO: 329 10*3/MM3 (ref 140–450)
PMV BLD AUTO: 9.5 FL (ref 6–12)
POTASSIUM SERPL-SCNC: 4 MMOL/L (ref 3.5–5.2)
PROT SERPL-MCNC: 6.8 G/DL (ref 6–8.5)
RBC # BLD AUTO: 5.02 10*6/MM3 (ref 4.14–5.8)
SODIUM SERPL-SCNC: 138 MMOL/L (ref 136–145)
TIBC SERPL-MCNC: 405 MCG/DL (ref 298–536)
TRANSFERRIN SERPL-MCNC: 272 MG/DL (ref 200–360)
TRIGL SERPL-MCNC: 164 MG/DL (ref 0–150)
TSH SERPL DL<=0.05 MIU/L-ACNC: 0.82 UIU/ML (ref 0.27–4.2)
VIT B12 BLD-MCNC: 850 PG/ML (ref 211–946)
VLDLC SERPL-MCNC: 28 MG/DL (ref 5–40)
WBC NRBC COR # BLD AUTO: 6.15 10*3/MM3 (ref 3.4–10.8)

## 2025-05-21 PROCEDURE — 82607 VITAMIN B-12: CPT | Performed by: NURSE PRACTITIONER

## 2025-05-21 PROCEDURE — 83540 ASSAY OF IRON: CPT | Performed by: NURSE PRACTITIONER

## 2025-05-21 PROCEDURE — 82746 ASSAY OF FOLIC ACID SERUM: CPT | Performed by: NURSE PRACTITIONER

## 2025-05-21 PROCEDURE — 86618 LYME DISEASE ANTIBODY: CPT | Performed by: NURSE PRACTITIONER

## 2025-05-21 PROCEDURE — 80050 GENERAL HEALTH PANEL: CPT | Performed by: NURSE PRACTITIONER

## 2025-05-21 PROCEDURE — 82306 VITAMIN D 25 HYDROXY: CPT | Performed by: NURSE PRACTITIONER

## 2025-05-21 PROCEDURE — 84466 ASSAY OF TRANSFERRIN: CPT | Performed by: NURSE PRACTITIONER

## 2025-05-21 PROCEDURE — 80061 LIPID PANEL: CPT | Performed by: NURSE PRACTITIONER

## 2025-05-21 RX ORDER — BETAMETHASONE DIPROPIONATE 0.5 MG/G
1 CREAM TOPICAL 2 TIMES DAILY
Qty: 45 G | Refills: 0 | Status: SHIPPED | OUTPATIENT
Start: 2025-05-21

## 2025-05-21 RX ORDER — CETIRIZINE HYDROCHLORIDE 10 MG/1
10 TABLET ORAL DAILY
Qty: 90 TABLET | Refills: 1 | Status: SHIPPED | OUTPATIENT
Start: 2025-05-21

## 2025-05-21 RX ORDER — MONTELUKAST SODIUM 10 MG/1
10 TABLET ORAL EVERY EVENING
Qty: 90 TABLET | Refills: 1 | Status: SHIPPED | OUTPATIENT
Start: 2025-05-21

## 2025-05-21 RX ORDER — OMEPRAZOLE 20 MG/1
20 CAPSULE, DELAYED RELEASE ORAL DAILY
Qty: 90 CAPSULE | Refills: 1 | Status: SHIPPED | OUTPATIENT
Start: 2025-05-21

## 2025-05-21 RX ORDER — LISINOPRIL 5 MG/1
5 TABLET ORAL 2 TIMES DAILY
Qty: 180 TABLET | Refills: 1 | Status: SHIPPED | OUTPATIENT
Start: 2025-05-21 | End: 2025-05-23 | Stop reason: SDUPTHER

## 2025-05-22 ENCOUNTER — TELEPHONE (OUTPATIENT)
Dept: FAMILY MEDICINE CLINIC | Facility: CLINIC | Age: 55
End: 2025-05-22
Payer: COMMERCIAL

## 2025-05-22 DIAGNOSIS — I10 PRIMARY HYPERTENSION: ICD-10-CM

## 2025-05-22 LAB — B BURGDOR IGG+IGM SER QL IA: NEGATIVE

## 2025-05-22 NOTE — PROGRESS NOTES
Chief Complaint  Hypertension    Subjective          Marcelino Ogden presents to [facility]  Hypertension  Associated symptoms: no blurred vision, no chest pain, no palpitations and no shortness of breath        History of Present Illness  The patient presents for a routine checkup.    He has been adhering to his prescribed antihypertensive medication regimen, although he does not monitor his blood pressure at home. He consumed approximately 5 cups of coffee today due to a series of meetings in [place]. He is not experiencing any chest pain or shortness of breath. He does not believe an EKG was performed during his last checkup with Dr. Stevenson.    Approximately 2 weeks ago, he experienced a severe illness that resulted in a weight loss of 7 pounds overnight. He suspects this may have been due to food poisoning from a ham sandwich consumed at [facility], as he fell ill approximately 6 hours after eating it.    He has experienced multiple tick bites recently, which have resulted in some irritation. He has been applying a topical cream for relief.    He continues to take his allergy medication and iron supplements. He reports a sensation of warmth in his knees when his iron levels decrease.    He has a known inguinal hernia.    He has been diagnosed with sleep apnea.    SOCIAL HISTORY  He does not smoke--.    FAMILY HISTORY  His sister wears a CPAP for sleep apnea.      Patient or patient representative verbalized consent for the use of Ambient Listening during the visit with  RIO Dent for chart documentation. 5/21/2025  20:44 EDT      Depression: Not at risk (5/21/2025)    PHQ-2     PHQ-2 Score: 0    and                Allergies  Silicone    Social History     Tobacco Use    Smoking status: Never    Smokeless tobacco: Current     Types: Chew    Tobacco comments:     Smokeless   Vaping Use    Vaping status: Never Used   Substance Use Topics    Alcohol use: Not  "Currently     Comment: Quit 15 years ago    Drug use: Never       Family History   Problem Relation Age of Onset    Cancer Mother     Stroke Father     Kidney nephrosis Father     Arthritis Father         Health Maintenance Due   Topic Date Due    ANNUAL PHYSICAL  Never done        Immunization History   Administered Date(s) Administered    COVID-19 (MODERNA) 12YRS+ (SPIKEVAX) 10/13/2023, 11/01/2024    COVID-19 (PFIZER) BIVALENT 12+YRS 10/31/2022    COVID-19 (PFIZER) Purple Cap Monovalent 03/19/2021, 04/16/2021, 12/07/2021    Flu Vaccine Intradermal Quad 18-64YR 10/13/2021    Flu Vaccine Quad PF >36MO 10/21/2019, 10/26/2020    Flu Vaccine Split Quad 10/18/2019    Hepatitis A 12/17/2019    Hepatitis B Adult/Adolescent IM 01/01/2006    Influenza Inj MDCK Preserative Free 10/17/2024    Influenza Injectable Mdck Pf Quad 10/31/2022    Shingrix 12/07/2021, 02/28/2022    Tdap 10/01/2019       Review of Systems   Eyes:  Negative for blurred vision.   Respiratory:  Negative for shortness of breath.    Cardiovascular:  Negative for chest pain and palpitations.        Objective       Vitals:    05/21/25 1336 05/21/25 1342   BP: 163/94 144/88   BP Location: Left arm Left arm   Patient Position: Sitting Sitting   Cuff Size: Adult Adult   Pulse: 75    Temp: 98.1 °F (36.7 °C)    SpO2: 96%    Weight: 90.6 kg (199 lb 11.2 oz)    Height: 174 cm (68.5\")        Body mass index is 29.92 kg/m².         Physical Exam  Vitals reviewed.   Constitutional:       Appearance: Normal appearance. He is well-developed.   HENT:      Right Ear: Tympanic membrane and ear canal normal.      Left Ear: Tympanic membrane and ear canal normal.      Nose: Rhinorrhea present. No congestion.      Mouth/Throat:      Pharynx: Posterior oropharyngeal erythema present. No oropharyngeal exudate.   Eyes:      Conjunctiva/sclera: Conjunctivae normal.   Cardiovascular:      Rate and Rhythm: Normal rate and regular rhythm.      Heart sounds: Normal heart sounds. No " murmur heard.  Pulmonary:      Effort: Pulmonary effort is normal.      Breath sounds: Normal breath sounds.   Neurological:      Mental Status: He is alert and oriented to person, place, and time.      Cranial Nerves: No cranial nerve deficit.      Motor: No weakness.   Psychiatric:         Mood and Affect: Mood and affect normal.           Physical Exam                Result Review :     The following data was reviewed by: RIO Dent on 2025:            XR Chest 2 View  Result Date: 2025  Impression: 1.No acute radiographic abnormality is identified. 2.Hiatal hernia. Electronically Signed: Alexandro Hussein MD  2025 5:08 PM EST  Workstation ID: [id number]           Results  Labs   - Lipid Panel: 2025, Cholesterol levels have increased    Imaging   - Chest x-ray: 2025, Hiatal hernia    Diagnostic Testing   - EK, Minor abnormalities that were not significant and have not changed from the year before                    Assessment and Plan      Assessment & Plan  Gastroesophageal reflux disease without esophagitis    Orders:    omeprazole (priLOSEC) 20 MG capsule; Take 1 capsule by mouth Daily.    Seasonal allergic rhinitis due to other allergic trigger    Orders:    montelukast (SINGULAIR) 10 MG tablet; Take 1 tablet by mouth Every Evening.    cetirizine (zyrTEC) 10 MG tablet; Take 1 tablet by mouth Daily.    Primary hypertension      Orders:    lisinopril (PRINIVIL,ZESTRIL) 5 MG tablet; Take 1 tablet by mouth 2 (Two) Times a Day.    Tick bite, unspecified site, initial encounter    Orders:    betamethasone dipropionate (DIPROSONE) 0.05 % cream; Apply 1 Application topically to the appropriate area as directed 2 (Two) Times a Day.    Lyme Disease Total Antibody With Reflex to Immunoassay    Annual physical exam  ADVICE WAS GIVEN RE:  ALCOHOL USE, SEATBELT USE, REGULAR EXERCISE, HEALTHY DIET, ROUTINE EYE AND DENTAL EXAMS    Orders:    Comprehensive Metabolic Panel    CBC &  Differential    TSH    Lipid Panel    Iron deficiency anemia, unspecified iron deficiency anemia type    Orders:    Iron Profile    Vitamin B12 & Folate    Vitamin D,25-Hydroxy       Diagnosis Plan   1. Annual physical exam  Comprehensive Metabolic Panel    CBC & Differential    TSH    Lipid Panel      2. Gastroesophageal reflux disease without esophagitis  omeprazole (priLOSEC) 20 MG capsule      3. Seasonal allergic rhinitis due to other allergic trigger  montelukast (SINGULAIR) 10 MG tablet    cetirizine (zyrTEC) 10 MG tablet      4. Primary hypertension  lisinopril (PRINIVIL,ZESTRIL) 5 MG tablet      5. Tick bite, unspecified site, initial encounter  betamethasone dipropionate (DIPROSONE) 0.05 % cream    Lyme Disease Total Antibody With Reflex to Immunoassay      6. Iron deficiency anemia, unspecified iron deficiency anemia type  Iron Profile    Vitamin B12 & Folate    Vitamin D,25-Hydroxy            Assessment & Plan  1. Hypertension.  - Blood pressure readings were elevated during this visit, with initial readings of 163/94 and subsequent readings of 144/88.  - Physical exam findings include elevated blood pressure readings.  - He is advised to monitor his blood pressure at home and report the readings.  - Current medications will be refilled.    2. Tick bites.  - He reported multiple tick bites, including one that still itches slightly.  - Physical exam findings include tick bites with no signs of irritation.  - A Lyme disease test will be conducted to rule out any potential issues.  - Lyme disease test ordered.    3. Inguinal hernia.  - He has a known inguinal hernia.  - Physical exam findings include no new symptoms related to the inguinal hernia.  - He is advised to continue monitoring for any changes.  - No new treatment required at this time.    4. Sleep apnea.  - He has a family history of sleep apnea and exhibits some symptoms.  - Physical exam findings include no significant abnormalities.  - An order  for a sleep study can be placed if he decides to proceed.  - He is advised to inform the office if he wishes to pursue this.    5. Health maintenance.  - He is due for a pneumonia vaccine.  - Physical exam findings include no acute issues.  - Blood work, including CBC, lipid panel, B12, and vitamin D levels, will be conducted today.  - Pneumonia vaccine will be administered today if available; otherwise, it will be scheduled for a later date.          Follow Up     Return in about 6 months (around 11/21/2025).    Patient was given instructions and counseling regarding his condition or for health maintenance advice. Please see specific information pulled into the AVS if appropriate.     Parts of this note are electronic transcriptions/translations of spoken language to printed text using the Dragon Dictation system.          Libra Alvarez, APRN  05/21/2025

## 2025-05-22 NOTE — ASSESSMENT & PLAN NOTE
Orders:    lisinopril (PRINIVIL,ZESTRIL) 5 MG tablet; Take 1 tablet by mouth 2 (Two) Times a Day.    
  Orders:    montelukast (SINGULAIR) 10 MG tablet; Take 1 tablet by mouth Every Evening.    cetirizine (zyrTEC) 10 MG tablet; Take 1 tablet by mouth Daily.    
details…

## 2025-05-23 ENCOUNTER — RESULTS FOLLOW-UP (OUTPATIENT)
Dept: FAMILY MEDICINE CLINIC | Facility: CLINIC | Age: 55
End: 2025-05-23
Payer: COMMERCIAL

## 2025-05-23 ENCOUNTER — TELEPHONE (OUTPATIENT)
Dept: FAMILY MEDICINE CLINIC | Facility: CLINIC | Age: 55
End: 2025-05-23
Payer: COMMERCIAL

## 2025-05-23 RX ORDER — LISINOPRIL 20 MG/1
10 TABLET ORAL 2 TIMES DAILY
Qty: 90 TABLET | Refills: 1 | Status: SHIPPED | OUTPATIENT
Start: 2025-05-23

## 2025-05-23 RX ORDER — LISINOPRIL 5 MG/1
10 TABLET ORAL 2 TIMES DAILY
Qty: 360 TABLET | Refills: 1 | Status: SHIPPED | OUTPATIENT
Start: 2025-05-23 | End: 2025-05-23

## 2025-05-23 NOTE — TELEPHONE ENCOUNTER
WANT TO SEE IF YOU WILL OK FOR THEM TO CHANGE THE LISINOPRIL - INSURANCE WILL NOT COVER HOW ITS WRITTEN    CAN IT BE CHANGED TO 20 MG 1/2 TWICE A DAY

## 2025-05-23 NOTE — TELEPHONE ENCOUNTER
Marcelino Ogden 1970  aware and voiced understanding. He also wanted to see if he needed his PSA checked again? If so he does not mind to come back in to have that collected

## (undated) DEVICE — SOL IRR NACL 0.9PCT BT 1000ML

## (undated) DEVICE — COLON KIT: Brand: MEDLINE INDUSTRIES, INC.

## (undated) DEVICE — Device: Brand: DEFENDO AIR/WATER/SUCTION AND BIOPSY VALVE

## (undated) DEVICE — SOL IRRG H2O PL/BG 1000ML STRL